# Patient Record
Sex: MALE | Race: WHITE | NOT HISPANIC OR LATINO | URBAN - METROPOLITAN AREA
[De-identification: names, ages, dates, MRNs, and addresses within clinical notes are randomized per-mention and may not be internally consistent; named-entity substitution may affect disease eponyms.]

---

## 2018-06-25 ENCOUNTER — EMERGENCY (EMERGENCY)
Facility: HOSPITAL | Age: 63
LOS: 1 days | Discharge: HOME | End: 2018-06-25
Attending: EMERGENCY MEDICINE
Payer: COMMERCIAL

## 2018-06-25 VITALS
RESPIRATION RATE: 18 BRPM | TEMPERATURE: 98 F | HEIGHT: 73 IN | SYSTOLIC BLOOD PRESSURE: 153 MMHG | OXYGEN SATURATION: 100 % | DIASTOLIC BLOOD PRESSURE: 88 MMHG | HEART RATE: 77 BPM | WEIGHT: 190.04 LBS

## 2018-06-25 PROCEDURE — 11760 REPAIR OF NAIL BED: CPT | Mod: F1

## 2018-06-25 PROCEDURE — 11760 REPAIR OF NAIL BED: CPT | Mod: 54

## 2018-06-25 PROCEDURE — 99283 EMERGENCY DEPT VISIT LOW MDM: CPT | Mod: 25

## 2018-06-25 RX ORDER — OXYCODONE HYDROCHLORIDE 5 MG/1
1 TABLET ORAL
Qty: 10 | Refills: 0
Start: 2018-06-25

## 2018-06-25 NOTE — ED ADULT TRIAGE NOTE - CHIEF COMPLAINT QUOTE
using razor knife at work, cut left index finger. patient is controlling bleeding with pressure and elevation.   baby aspirin daily. last tetanus within the last ten years.

## 2018-06-25 NOTE — ED PROVIDER NOTE - MEDICAL DECISION MAKING DETAILS
Laceration repair; Patient UTD on tetanus; No suspicion for foreign body based on mechanism of injury and exam. Laceration repair; Patient UTD on tetanus; No suspicion for foreign body based on mechanism of injury and exam.      Attending note-laceration to left index finger. Suture. Tetanus is up-to-date.

## 2018-06-25 NOTE — ED PROVIDER NOTE - OBJECTIVE STATEMENT
64 yo male with no pertinent PMH presents to the ED s/p cut to left index finger sustained one hour prior to ED arrival. Pt was cutting vinyl with a razor at his office when the blade skipped and he mistakenly cut his finger. UTD on tetanus, takes one 81mg ASA a day, otherwise no AC. 62 yo male with no pertinent PMH presents to the ED s/p cut to left index finger sustained one hour prior to ED arrival. Pt was cutting vinyl with a razor at his office when the blade skipped and he mistakenly cut his finger. UTD on tetanus, takes one 81mg ASA a day, otherwise no AC.        Attending note. Patient was seen in the fast track room #2. Patient is right-hand-dominant and lacerated his left index finger with a knife today. Patient denies any numbness or paresthesia. Patient states his tetanus is within 10 years.

## 2018-06-25 NOTE — ED PROVIDER NOTE - PHYSICAL EXAMINATION
Skin: laceration to left indes finger involving distal aspect of finger. Laceration located along latera aspect of finger and abutting but not involving the nail. Full ROM, sensation grossly intact, no subungual hematoma, capillary refill less than 2 seconds Skin: laceration to left indes finger involving distal aspect of finger. Laceration located along latera aspect of finger and abutting but not involving the nail. Full ROM, sensation grossly intact, no subungual hematoma, capillary refill less than 2 seconds        Attending note. Patient is alert and in no acute distress. Examination of the left hand reveals a 3 cm flap V-shaped laceration on the radial aspect of the distal finger. Patient has good capillary refill. Sensation is intact.

## 2018-07-06 ENCOUNTER — EMERGENCY (EMERGENCY)
Facility: HOSPITAL | Age: 63
LOS: 1 days | Discharge: HOME | End: 2018-07-06
Attending: EMERGENCY MEDICINE
Payer: COMMERCIAL

## 2018-07-06 VITALS
RESPIRATION RATE: 18 BRPM | HEIGHT: 73 IN | DIASTOLIC BLOOD PRESSURE: 77 MMHG | WEIGHT: 195.11 LBS | OXYGEN SATURATION: 98 % | HEART RATE: 72 BPM | TEMPERATURE: 98 F | SYSTOLIC BLOOD PRESSURE: 128 MMHG

## 2018-07-06 PROCEDURE — G0463: CPT

## 2018-07-06 NOTE — ED ADULT NURSE NOTE - CHPI ED SYMPTOMS NEG
no drainage/no fever/no pain/no red streaks/no inflammation/no purulent drainage/no bleeding/no chills/no redness

## 2018-07-06 NOTE — ED PROVIDER NOTE - CARE PLAN
Principal Discharge DX:	Laceration of finger  Goal:	L hand, 2nd digit, subsequent encounter  Assessment and plan of treatment:	1) Please return to the ED should you have any new or worsening symptoms, worsening pain, develop fever, discharge or pus from the wound  2) Please follow up with your primary care doctor in 2-3 days.

## 2018-07-06 NOTE — ED PROVIDER NOTE - PLAN OF CARE
L hand, 2nd digit, subsequent encounter 1) Please return to the ED should you have any new or worsening symptoms, worsening pain, develop fever, discharge or pus from the wound  2) Please follow up with your primary care doctor in 2-3 days.

## 2018-07-06 NOTE — ED ADULT NURSE NOTE - OBJECTIVE STATEMENT
Pt presents for eval and removal of sutures tip of left 2nd finger after placement 11 days ago.  Wound margins well approximated, without erythema or drainage.

## 2018-07-06 NOTE — ED PROVIDER NOTE - MUSCULOSKELETAL, MLM
Strength appropriate for age. Full active range of motion of all 4 extremities. Full ROM of L hand all digits.

## 2018-07-06 NOTE — ED PROVIDER NOTE - ATTENDING CONTRIBUTION TO CARE
Attending MD Salter: I personally have seen and examined this patient.  Resident note reviewed and agree on plan of care and except where noted.  See below for details.     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  Reports injury sustatined while using a new knife, L index finger lac.  Denies bleeding, purulence, fevers, chills.  R handed.  On exam, NAD, L index finger with edges well approximated, no fluctuance, no erythema, no purulence, no bleeding; A/P: 63M with laceration, now well approximated, see procedure note for removal of sutures

## 2018-07-06 NOTE — ED PROVIDER NOTE - OBJECTIVE STATEMENT
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 No f/c, redness, discharge. Full ROM of L hand. Patient is RH dominant.

## 2018-07-06 NOTE — ED PROVIDER NOTE - MEDICAL DECISION MAKING DETAILS
Jason Ward (Resident): 64 y/o, laceration of L hand 2nd digit - suture removal - looks well, no fevers, discharge, or pus from wound - full ROM - will remove sutures and d/c home - d/w patient any fevers, swelling or concerning symptoms- provided him some bacitracin for wound

## 2021-04-14 ENCOUNTER — APPOINTMENT (OUTPATIENT)
Dept: UROLOGY | Facility: CLINIC | Age: 66
End: 2021-04-14
Payer: COMMERCIAL

## 2021-04-14 VITALS
WEIGHT: 212 LBS | BODY MASS INDEX: 28.1 KG/M2 | HEART RATE: 82 BPM | TEMPERATURE: 97.2 F | RESPIRATION RATE: 18 BRPM | SYSTOLIC BLOOD PRESSURE: 134 MMHG | OXYGEN SATURATION: 97 % | DIASTOLIC BLOOD PRESSURE: 92 MMHG | HEIGHT: 73 IN

## 2021-04-14 DIAGNOSIS — Z86.79 PERSONAL HISTORY OF OTHER DISEASES OF THE CIRCULATORY SYSTEM: ICD-10-CM

## 2021-04-14 DIAGNOSIS — Z86.39 PERSONAL HISTORY OF OTHER ENDOCRINE, NUTRITIONAL AND METABOLIC DISEASE: ICD-10-CM

## 2021-04-14 DIAGNOSIS — Z72.89 OTHER PROBLEMS RELATED TO LIFESTYLE: ICD-10-CM

## 2021-04-14 DIAGNOSIS — R97.20 ELEVATED PROSTATE, SPECIFIC ANTIGEN [PSA]: ICD-10-CM

## 2021-04-14 PROCEDURE — 99203 OFFICE O/P NEW LOW 30 MIN: CPT

## 2021-04-14 PROCEDURE — 99072 ADDL SUPL MATRL&STAF TM PHE: CPT

## 2021-04-14 NOTE — ASSESSMENT
[FreeTextEntry1] : Reviewed PSA and labs with patient. \par Explained MRI results with patient. \par Highly recommended  a fusion transperineal prostate biopsy. Discussed the procedure with patient.\par MRI done at [facility]. Disk to be uploaded and marked for fusion biopsy with [facility].\par Rx for diazepam ordered and advised patient to obtain a Fleet enema. \par Answered all questions and addressed all concerns. \par Will schedule patient for next available biopsy date with follow-up appointment. \par 30 min discussion with review of images by me before and writing his note

## 2021-04-14 NOTE — PHYSICAL EXAM
[Normal Appearance] : normal appearance [Well Groomed] : well groomed [General Appearance - In No Acute Distress] : no acute distress [Edema] : no peripheral edema [] : no respiratory distress [Urethral Meatus] : meatus normal [Normal Station and Gait] : the gait and station were normal for the patient's age [Skin Color & Pigmentation] : normal skin color and pigmentation [No Focal Deficits] : no focal deficits [Oriented To Time, Place, And Person] : oriented to person, place, and time [Affect] : the affect was normal [No Palpable Adenopathy] : no palpable adenopathy [Abdomen Soft] : soft [Abdomen Tenderness] : non-tender [Costovertebral Angle Tenderness] : no ~M costovertebral angle tenderness [FreeTextEntry1] : STEFANIE firm, hard prostate palpated.

## 2021-04-14 NOTE — HISTORY OF PRESENT ILLNESS
[None] : no symptoms [FreeTextEntry1] : 65 yo presents today for an elevated PSA. PCP Dr. Galeas had ordered routine labs with PSA. PSA 4.3 (8.7.2020). MRI prostate ordered and performed 3.30.2021.MRI prostate showed a PIRADS 5 right posterior peripheral zone involving the left posteromedial peripheral zone suspicious for synovial prostate cancer. \par No family history of prostate cancer. \par Referred for fusion transperineal prostate biopsy.

## 2021-04-14 NOTE — ASSESSMENT
[FreeTextEntry1] : Reviewed PSA and labs with patient. \par Explained MRI results with patient. \par Highly recommended  a fusion transperineal prostate biopsy. Discussed the procedure with patient.\par MRI done at [facility]. Disk to be uploaded and marked for fusion biopsy with [facility].\par Rx for diazepam ordered and advised patient to obtain a Fleet enema. \par Answered all questions and addressed all concerns. \par Will schedule patient for next available biopsy date with follow-up appointment. \par 30 min discussion with review of images by me before and writing his note

## 2021-04-19 ENCOUNTER — NON-APPOINTMENT (OUTPATIENT)
Age: 66
End: 2021-04-19

## 2021-05-11 ENCOUNTER — NON-APPOINTMENT (OUTPATIENT)
Age: 66
End: 2021-05-11

## 2021-05-12 ENCOUNTER — APPOINTMENT (OUTPATIENT)
Dept: UROLOGY | Facility: CLINIC | Age: 66
End: 2021-05-12
Payer: COMMERCIAL

## 2021-05-12 VITALS
HEART RATE: 75 BPM | HEIGHT: 73 IN | SYSTOLIC BLOOD PRESSURE: 121 MMHG | DIASTOLIC BLOOD PRESSURE: 82 MMHG | BODY MASS INDEX: 28.23 KG/M2 | WEIGHT: 213 LBS | TEMPERATURE: 97.2 F | OXYGEN SATURATION: 98 %

## 2021-05-12 VITALS — DIASTOLIC BLOOD PRESSURE: 76 MMHG | SYSTOLIC BLOOD PRESSURE: 120 MMHG

## 2021-05-12 PROCEDURE — 76872 US TRANSRECTAL: CPT

## 2021-05-12 PROCEDURE — 99072 ADDL SUPL MATRL&STAF TM PHE: CPT

## 2021-05-12 PROCEDURE — 55700: CPT | Mod: 22

## 2021-05-12 PROCEDURE — 76377 3D RENDER W/INTRP POSTPROCES: CPT

## 2021-05-12 PROCEDURE — 76942 ECHO GUIDE FOR BIOPSY: CPT | Mod: 59

## 2021-05-17 LAB — CORE LAB BIOPSY: NORMAL

## 2021-05-19 ENCOUNTER — APPOINTMENT (OUTPATIENT)
Dept: UROLOGY | Facility: CLINIC | Age: 66
End: 2021-05-19
Payer: COMMERCIAL

## 2021-05-19 VITALS
HEIGHT: 73 IN | SYSTOLIC BLOOD PRESSURE: 132 MMHG | TEMPERATURE: 97.4 F | BODY MASS INDEX: 28.23 KG/M2 | WEIGHT: 213 LBS | DIASTOLIC BLOOD PRESSURE: 88 MMHG | HEART RATE: 69 BPM | OXYGEN SATURATION: 97 %

## 2021-05-19 PROCEDURE — 99072 ADDL SUPL MATRL&STAF TM PHE: CPT

## 2021-05-19 PROCEDURE — 99214 OFFICE O/P EST MOD 30 MIN: CPT

## 2021-05-19 NOTE — PHYSICAL EXAM
[General Appearance - Well Developed] : well developed [General Appearance - Well Nourished] : well nourished [Normal Appearance] : normal appearance [Well Groomed] : well groomed [General Appearance - In No Acute Distress] : no acute distress [Edema] : no peripheral edema [] : no respiratory distress [Respiration, Rhythm And Depth] : normal respiratory rhythm and effort [Exaggerated Use Of Accessory Muscles For Inspiration] : no accessory muscle use [Oriented To Time, Place, And Person] : oriented to person, place, and time [Affect] : the affect was normal [Mood] : the mood was normal [Not Anxious] : not anxious [No Focal Deficits] : no focal deficits [Normal Station and Gait] : the gait and station were normal for the patient's age [Abdomen Soft] : soft [Abdomen Tenderness] : non-tender [Costovertebral Angle Tenderness] : no ~M costovertebral angle tenderness [Urethral Meatus] : meatus normal [Urinary Bladder Findings] : the bladder was normal on palpation [Scrotum] : the scrotum was normal [Testes Mass (___cm)] : there were no testicular masses

## 2021-05-19 NOTE — ASSESSMENT
[FreeTextEntry1] : 66yoM with Dominic 4+3 prostate cancer, some intraductal component and perineural invasion\par We discussed all treatment options for treatment of localized prostate cancer.   \par These included active surveillance, radiation therapy, IMRT and Cyberknife and radical prostatectomy\par We discussed the risks, benefits and complications of radiation therapy.   We discussed the option of robotic radical prostatectomy and the advantages and disadvantages.   We discussed the risks, benefits and alternatives and complications specifically impotence and incontinence.  We discussed the procedure, in hospital stay and the recovery and expectations.  We discussed my experience with this procedure and my outcomes.\par \par We discussed the cancer outcomes of each options as well.\par \par -Discussed options for treatment, including surgery and radiation +HT\par -Will refer to radiation oncology for consultation\par -Will obtain CT A&P\par -Will obtain bone scan

## 2021-05-19 NOTE — HISTORY OF PRESENT ILLNESS
[None] : no symptoms [FreeTextEntry1] : 66yoM with Dominic 4+3, 3+4, 3+3 prostate cancer.\par PSA 4.3, PIRADS 5 lesion with EPE on MRI.\par Some intraductal carcinoma present.

## 2021-05-29 ENCOUNTER — APPOINTMENT (OUTPATIENT)
Dept: CT IMAGING | Facility: CLINIC | Age: 66
End: 2021-05-29
Payer: COMMERCIAL

## 2021-05-29 ENCOUNTER — OUTPATIENT (OUTPATIENT)
Dept: OUTPATIENT SERVICES | Facility: HOSPITAL | Age: 66
LOS: 1 days | End: 2021-05-29
Payer: COMMERCIAL

## 2021-05-29 DIAGNOSIS — C61 MALIGNANT NEOPLASM OF PROSTATE: ICD-10-CM

## 2021-05-29 PROCEDURE — 74177 CT ABD & PELVIS W/CONTRAST: CPT | Mod: 26

## 2021-05-29 PROCEDURE — 74177 CT ABD & PELVIS W/CONTRAST: CPT

## 2021-05-29 PROCEDURE — 82565 ASSAY OF CREATININE: CPT

## 2021-06-02 ENCOUNTER — NON-APPOINTMENT (OUTPATIENT)
Age: 66
End: 2021-06-02

## 2021-06-07 ENCOUNTER — APPOINTMENT (OUTPATIENT)
Dept: UROLOGY | Facility: CLINIC | Age: 66
End: 2021-06-07
Payer: COMMERCIAL

## 2021-06-07 VITALS — HEIGHT: 73 IN | RESPIRATION RATE: 15 BRPM | TEMPERATURE: 97.7 F

## 2021-06-07 PROCEDURE — 99072 ADDL SUPL MATRL&STAF TM PHE: CPT

## 2021-06-07 PROCEDURE — 99214 OFFICE O/P EST MOD 30 MIN: CPT

## 2021-06-07 NOTE — PHYSICAL EXAM
[General Appearance - Well Developed] : well developed [General Appearance - Well Nourished] : well nourished [Normal Appearance] : normal appearance [Well Groomed] : well groomed [General Appearance - In No Acute Distress] : no acute distress [] : no respiratory distress [Respiration, Rhythm And Depth] : normal respiratory rhythm and effort [Exaggerated Use Of Accessory Muscles For Inspiration] : no accessory muscle use [Oriented To Time, Place, And Person] : oriented to person, place, and time [Affect] : the affect was normal [Mood] : the mood was normal [Not Anxious] : not anxious [Normal Station and Gait] : the gait and station were normal for the patient's age [No Focal Deficits] : no focal deficits [Abdomen Soft] : soft [Abdomen Tenderness] : non-tender [Costovertebral Angle Tenderness] : no ~M costovertebral angle tenderness [Urethral Meatus] : meatus normal [Urinary Bladder Findings] : the bladder was normal on palpation [Scrotum] : the scrotum was normal [Testes Mass (___cm)] : there were no testicular masses

## 2021-06-07 NOTE — ASSESSMENT
[FreeTextEntry1] : 66yoM with [place] 4+3 prostate cancer, some intraductal component and perineural invasion\par We discussed all treatment options for treatment of localized prostate cancer.   \par These included active surveillance, radiation therapy, IMRT and Cyberknife and radical prostatectomy\par We discussed the risks, benefits and complications of radiation therapy.   We discussed the option of robotic radical prostatectomy and the advantages and disadvantages.   We discussed the risks, benefits and alternatives and complications specifically impotence and incontinence.  We discussed the procedure, in hospital stay and the recovery and expectations.  We discussed my experience with this procedure and my outcomes.\par \par We discussed the cancer outcomes of each options as well.\par \par -Discussed options for treatment, including surgery and radiation +HT\par -Patient desires surgery\par -Surgical consent signed today\par -Will book for surgery\par -Bone scan scheduled for Wednesday

## 2021-06-07 NOTE — HISTORY OF PRESENT ILLNESS
[None] : no symptoms [FreeTextEntry1] : 66yoM with Dominic 4+3, 3+4, 3+3 prostate cancer.\par PSA 4.3, PIRADS 5 lesion with EPE on MRI.\par Some intraductal carcinoma present.\par CT no evidence of metastatic disease, bone scan pending.\par \par No prior abdominal surgery.

## 2021-06-09 ENCOUNTER — OUTPATIENT (OUTPATIENT)
Dept: OUTPATIENT SERVICES | Facility: HOSPITAL | Age: 66
LOS: 1 days | End: 2021-06-09
Payer: COMMERCIAL

## 2021-06-09 ENCOUNTER — APPOINTMENT (OUTPATIENT)
Dept: NUCLEAR MEDICINE | Facility: CLINIC | Age: 66
End: 2021-06-09
Payer: COMMERCIAL

## 2021-06-09 ENCOUNTER — RESULT REVIEW (OUTPATIENT)
Age: 66
End: 2021-06-09

## 2021-06-09 ENCOUNTER — OUTPATIENT (OUTPATIENT)
Dept: OUTPATIENT SERVICES | Facility: HOSPITAL | Age: 66
LOS: 1 days | End: 2021-06-09

## 2021-06-09 ENCOUNTER — NON-APPOINTMENT (OUTPATIENT)
Age: 66
End: 2021-06-09

## 2021-06-09 DIAGNOSIS — C61 MALIGNANT NEOPLASM OF PROSTATE: ICD-10-CM

## 2021-06-09 PROCEDURE — 78306 BONE IMAGING WHOLE BODY: CPT | Mod: 26

## 2021-06-09 PROCEDURE — 88321 CONSLTJ&REPRT SLD PREP ELSWR: CPT

## 2021-06-10 DIAGNOSIS — C61 MALIGNANT NEOPLASM OF PROSTATE: ICD-10-CM

## 2021-06-16 ENCOUNTER — RESULT REVIEW (OUTPATIENT)
Age: 66
End: 2021-06-16

## 2021-06-16 ENCOUNTER — OUTPATIENT (OUTPATIENT)
Dept: OUTPATIENT SERVICES | Facility: HOSPITAL | Age: 66
LOS: 1 days | End: 2021-06-16
Payer: COMMERCIAL

## 2021-06-16 VITALS
WEIGHT: 207.9 LBS | SYSTOLIC BLOOD PRESSURE: 133 MMHG | TEMPERATURE: 98 F | RESPIRATION RATE: 16 BRPM | DIASTOLIC BLOOD PRESSURE: 85 MMHG | OXYGEN SATURATION: 100 % | HEIGHT: 73 IN | HEART RATE: 65 BPM

## 2021-06-16 DIAGNOSIS — M51.26 OTHER INTERVERTEBRAL DISC DISPLACEMENT, LUMBAR REGION: Chronic | ICD-10-CM

## 2021-06-16 DIAGNOSIS — Z98.890 OTHER SPECIFIED POSTPROCEDURAL STATES: Chronic | ICD-10-CM

## 2021-06-16 DIAGNOSIS — C61 MALIGNANT NEOPLASM OF PROSTATE: ICD-10-CM

## 2021-06-16 DIAGNOSIS — Z29.9 ENCOUNTER FOR PROPHYLACTIC MEASURES, UNSPECIFIED: ICD-10-CM

## 2021-06-16 DIAGNOSIS — Z01.818 ENCOUNTER FOR OTHER PREPROCEDURAL EXAMINATION: ICD-10-CM

## 2021-06-16 LAB
ANION GAP SERPL CALC-SCNC: 4 MMOL/L — LOW (ref 5–17)
APPEARANCE UR: CLEAR — SIGNIFICANT CHANGE UP
APTT BLD: 27.2 SEC — LOW (ref 27.5–35.5)
BASOPHILS # BLD AUTO: 0.03 K/UL — SIGNIFICANT CHANGE UP (ref 0–0.2)
BASOPHILS NFR BLD AUTO: 0.4 % — SIGNIFICANT CHANGE UP (ref 0–2)
BILIRUB UR-MCNC: NEGATIVE — SIGNIFICANT CHANGE UP
BUN SERPL-MCNC: 15 MG/DL — SIGNIFICANT CHANGE UP (ref 7–23)
CALCIUM SERPL-MCNC: 8.7 MG/DL — SIGNIFICANT CHANGE UP (ref 8.5–10.1)
CHLORIDE SERPL-SCNC: 107 MMOL/L — SIGNIFICANT CHANGE UP (ref 96–108)
CO2 SERPL-SCNC: 28 MMOL/L — SIGNIFICANT CHANGE UP (ref 22–31)
COLOR SPEC: YELLOW — SIGNIFICANT CHANGE UP
CREAT SERPL-MCNC: 1.03 MG/DL — SIGNIFICANT CHANGE UP (ref 0.5–1.3)
DIFF PNL FLD: ABNORMAL
EOSINOPHIL # BLD AUTO: 0.19 K/UL — SIGNIFICANT CHANGE UP (ref 0–0.5)
EOSINOPHIL NFR BLD AUTO: 2.5 % — SIGNIFICANT CHANGE UP (ref 0–6)
GLUCOSE SERPL-MCNC: 103 MG/DL — HIGH (ref 70–99)
GLUCOSE UR QL: NEGATIVE MG/DL — SIGNIFICANT CHANGE UP
HCT VFR BLD CALC: 46.8 % — SIGNIFICANT CHANGE UP (ref 39–50)
HGB BLD-MCNC: 16.1 G/DL — SIGNIFICANT CHANGE UP (ref 13–17)
IMM GRANULOCYTES NFR BLD AUTO: 0.3 % — SIGNIFICANT CHANGE UP (ref 0–1.5)
INR BLD: 1.12 RATIO — SIGNIFICANT CHANGE UP (ref 0.88–1.16)
KETONES UR-MCNC: NEGATIVE — SIGNIFICANT CHANGE UP
LEUKOCYTE ESTERASE UR-ACNC: NEGATIVE — SIGNIFICANT CHANGE UP
LYMPHOCYTES # BLD AUTO: 1.5 K/UL — SIGNIFICANT CHANGE UP (ref 1–3.3)
LYMPHOCYTES # BLD AUTO: 19.7 % — SIGNIFICANT CHANGE UP (ref 13–44)
MCHC RBC-ENTMCNC: 31.4 PG — SIGNIFICANT CHANGE UP (ref 27–34)
MCHC RBC-ENTMCNC: 34.4 GM/DL — SIGNIFICANT CHANGE UP (ref 32–36)
MCV RBC AUTO: 91.2 FL — SIGNIFICANT CHANGE UP (ref 80–100)
MONOCYTES # BLD AUTO: 0.59 K/UL — SIGNIFICANT CHANGE UP (ref 0–0.9)
MONOCYTES NFR BLD AUTO: 7.8 % — SIGNIFICANT CHANGE UP (ref 2–14)
NEUTROPHILS # BLD AUTO: 5.28 K/UL — SIGNIFICANT CHANGE UP (ref 1.8–7.4)
NEUTROPHILS NFR BLD AUTO: 69.3 % — SIGNIFICANT CHANGE UP (ref 43–77)
NITRITE UR-MCNC: NEGATIVE — SIGNIFICANT CHANGE UP
PH UR: 5 — SIGNIFICANT CHANGE UP (ref 5–8)
PLATELET # BLD AUTO: 238 K/UL — SIGNIFICANT CHANGE UP (ref 150–400)
POTASSIUM SERPL-MCNC: 4 MMOL/L — SIGNIFICANT CHANGE UP (ref 3.5–5.3)
POTASSIUM SERPL-SCNC: 4 MMOL/L — SIGNIFICANT CHANGE UP (ref 3.5–5.3)
PROT UR-MCNC: 15 MG/DL
PROTHROM AB SERPL-ACNC: 12.9 SEC — SIGNIFICANT CHANGE UP (ref 10.6–13.6)
RBC # BLD: 5.13 M/UL — SIGNIFICANT CHANGE UP (ref 4.2–5.8)
RBC # FLD: 12.8 % — SIGNIFICANT CHANGE UP (ref 10.3–14.5)
SODIUM SERPL-SCNC: 139 MMOL/L — SIGNIFICANT CHANGE UP (ref 135–145)
SP GR SPEC: 1.01 — SIGNIFICANT CHANGE UP (ref 1.01–1.02)
UROBILINOGEN FLD QL: NEGATIVE MG/DL — SIGNIFICANT CHANGE UP
WBC # BLD: 7.61 K/UL — SIGNIFICANT CHANGE UP (ref 3.8–10.5)
WBC # FLD AUTO: 7.61 K/UL — SIGNIFICANT CHANGE UP (ref 3.8–10.5)

## 2021-06-16 PROCEDURE — 86850 RBC ANTIBODY SCREEN: CPT

## 2021-06-16 PROCEDURE — 36415 COLL VENOUS BLD VENIPUNCTURE: CPT

## 2021-06-16 PROCEDURE — 85610 PROTHROMBIN TIME: CPT

## 2021-06-16 PROCEDURE — 85730 THROMBOPLASTIN TIME PARTIAL: CPT

## 2021-06-16 PROCEDURE — 87086 URINE CULTURE/COLONY COUNT: CPT

## 2021-06-16 PROCEDURE — G0463: CPT | Mod: 25

## 2021-06-16 PROCEDURE — 71046 X-RAY EXAM CHEST 2 VIEWS: CPT

## 2021-06-16 PROCEDURE — 80048 BASIC METABOLIC PNL TOTAL CA: CPT

## 2021-06-16 PROCEDURE — 85025 COMPLETE CBC W/AUTO DIFF WBC: CPT

## 2021-06-16 PROCEDURE — 71046 X-RAY EXAM CHEST 2 VIEWS: CPT | Mod: 26

## 2021-06-16 PROCEDURE — 86901 BLOOD TYPING SEROLOGIC RH(D): CPT

## 2021-06-16 PROCEDURE — 86900 BLOOD TYPING SEROLOGIC ABO: CPT

## 2021-06-16 PROCEDURE — 81001 URINALYSIS AUTO W/SCOPE: CPT

## 2021-06-16 NOTE — H&P PST ADULT - NSICDXPASTMEDICALHX_GEN_ALL_CORE_FT
PAST MEDICAL HISTORY:  COVID-19 vaccine series completed moderna . 2 doses.last dose 4/11/2021    Graves disease     Hyperlipidemia     Hypertension     Hyperthyroidism     Lumbar herniated disc     Prostate cancer

## 2021-06-16 NOTE — H&P PST ADULT - HISTORY OF PRESENT ILLNESS
66 years old male with elevated PSA. He had a MRI with contrast. He then had a biopsy . Diagnosed with Prostate cancer. He then had a bone and CT. Scan of pelvis. Denies difficulty urinating or hematuria. Planned Prostatectomy.

## 2021-06-16 NOTE — H&P PST ADULT - ASSESSMENT
66 years old male present to PST prior to robotic radical prostatectomy with Dr. Rob.    Plan   1. NPO after midnight  2. Stop Aspirin 7 days prior to surgery  3. Use E-Z sponge as directed  4. Covid swab scheduled for 2021  5. Drink a quart of extra  fluids the day before your surgery.  6 Medical optimization for surgery with Dr. Galeas  7. CBC, BMP, PT/ INR and PTT, Urinalysis, Urine Culture, Type and Screen sent to lab  8. EKG done by Dr. Galeas  9. Chest x- ray done in [facility]    CAPRINI SCORE [CLOT]    AGE RELATED RISK FACTORS                                                       MOBILITY RELATED FACTORS  [ ] Age 41-60 years                                            (1 Point)                  [ ] Bed rest                                                        (1 Point)  [x ] Age: 61-74 years                                           (2 Points)                 [ ] Plaster cast                                                   (2 Points)  [ ] Age= 75 years                                              (3 Points)                 [ ] Bed bound for more than 72 hours                 (2 Points)    DISEASE RELATED RISK FACTORS                                               GENDER SPECIFIC FACTORS  [ ] Edema in the lower extremities                       (1 Point)                  [ ] Pregnancy                                                     (1 Point)  [ ] Varicose veins                                               (1 Point)                  [ ] Post-partum < 6 weeks                                   (1 Point)             [ x] BMI > 25 Kg/m2                                            (1 Point)                  [ ] Hormonal therapy  or oral contraception          (1 Point)                 [ ] Sepsis (in the previous month)                        (1 Point)                  [ ] History of pregnancy complications                 (1 point)  [ ] Pneumonia or serious lung disease                                               [ ] Unexplained or recurrent                     (1 Point)           (in the previous month)                               (1 Point)  [ ] Abnormal pulmonary function test                     (1 Point)                 SURGERY RELATED RISK FACTORS  [ ] Acute myocardial infarction                              (1 Point)                 [ ]  Section                                             (1 Point)  [ ] Congestive heart failure (in the previous month)  (1 Point)               [ ] Minor surgery                                                  (1 Point)   [ ] Inflammatory bowel disease                             (1 Point)                 [ ] Arthroscopic surgery                                        (2 Points)  [ ] Central venous access                                      (2 Points)                [x ] General surgery lasting more than 45 minutes   (2 Points)       [ ] Stroke (in the previous month)                          (5 Points)               [ ] Elective arthroplasty                                         (5 Points)            [ ] malignancy present or previous                      (2 points)                                                                                                                                   HEMATOLOGY RELATED FACTORS                                                 TRAUMA RELATED RISK FACTORS  [ ] Prior episodes of VTE                                     (3 Points)                 [ ] Fracture of the hip, pelvis, or leg                       (5 Points)  [ ] Positive family history for VTE                         (3 Points)                 [ ] Acute spinal cord injury (in the previous month)  (5 Points)  [ ] Prothrombin 13336 A                                     (3 Points)                 [ ] Paralysis  (less than 1 month)                             (5 Points)  [ ] Factor V Leiden                                             (3 Points)                  [ ] Multiple Trauma within 1 month                        (5 Points)  [ ] Lupus anticoagulants                                     (3 Points)                                                           [ ] Anticardiolipin antibodies                               (3 Points)                                                       [ ] High homocysteine in the blood                      (3 Points)                                             [ ] Other congenital or acquired thrombophilia      (3 Points)                                                [ ] Heparin induced thrombocytopenia                  (3 Points)                                          Total Score [     5     ]

## 2021-06-16 NOTE — H&P PST ADULT - BLOOD AVOIDANCE/RESTRICTIONS, PROFILE
Patient reminded to  new prescription for trelegy inhaler  Follow up with pulmonology as ordered  none

## 2021-06-16 NOTE — H&P PST ADULT - NSICDXFAMILYHX_GEN_ALL_CORE_FT
FAMILY HISTORY:  Father  Still living? No  Family history of heart disease, Age at diagnosis: Age Unknown  Family history of Parkinson's disease, Age at diagnosis: Age Unknown    Mother  Still living? Yes, Estimated age:   Family history of heart disease, Age at diagnosis: Age Unknown

## 2021-06-17 DIAGNOSIS — Z01.818 ENCOUNTER FOR OTHER PREPROCEDURAL EXAMINATION: ICD-10-CM

## 2021-06-17 DIAGNOSIS — C61 MALIGNANT NEOPLASM OF PROSTATE: ICD-10-CM

## 2021-06-17 LAB
CULTURE RESULTS: SIGNIFICANT CHANGE UP
SPECIMEN SOURCE: SIGNIFICANT CHANGE UP

## 2021-06-22 ENCOUNTER — APPOINTMENT (OUTPATIENT)
Dept: DISASTER EMERGENCY | Facility: CLINIC | Age: 66
End: 2021-06-22

## 2021-06-22 DIAGNOSIS — Z01.818 ENCOUNTER FOR OTHER PREPROCEDURAL EXAMINATION: ICD-10-CM

## 2021-06-22 LAB — SARS-COV-2 N GENE NPH QL NAA+PROBE: NOT DETECTED

## 2021-06-23 ENCOUNTER — TRANSCRIPTION ENCOUNTER (OUTPATIENT)
Age: 66
End: 2021-06-23

## 2021-06-25 ENCOUNTER — APPOINTMENT (OUTPATIENT)
Dept: UROLOGY | Facility: HOSPITAL | Age: 66
End: 2021-06-25

## 2021-06-25 ENCOUNTER — RESULT REVIEW (OUTPATIENT)
Age: 66
End: 2021-06-25

## 2021-06-25 ENCOUNTER — INPATIENT (INPATIENT)
Facility: HOSPITAL | Age: 66
LOS: 0 days | Discharge: HOME | DRG: 708 | End: 2021-06-26
Attending: UROLOGY | Admitting: UROLOGY
Payer: COMMERCIAL

## 2021-06-25 VITALS
SYSTOLIC BLOOD PRESSURE: 128 MMHG | HEIGHT: 73 IN | OXYGEN SATURATION: 100 % | WEIGHT: 201.94 LBS | RESPIRATION RATE: 14 BRPM | HEART RATE: 66 BPM | TEMPERATURE: 98 F | DIASTOLIC BLOOD PRESSURE: 75 MMHG

## 2021-06-25 DIAGNOSIS — C61 MALIGNANT NEOPLASM OF PROSTATE: ICD-10-CM

## 2021-06-25 DIAGNOSIS — I10 ESSENTIAL (PRIMARY) HYPERTENSION: ICD-10-CM

## 2021-06-25 DIAGNOSIS — E78.5 HYPERLIPIDEMIA, UNSPECIFIED: ICD-10-CM

## 2021-06-25 DIAGNOSIS — Z79.890 HORMONE REPLACEMENT THERAPY: ICD-10-CM

## 2021-06-25 DIAGNOSIS — E03.9 HYPOTHYROIDISM, UNSPECIFIED: ICD-10-CM

## 2021-06-25 DIAGNOSIS — Z79.82 LONG TERM (CURRENT) USE OF ASPIRIN: ICD-10-CM

## 2021-06-25 DIAGNOSIS — Z98.890 OTHER SPECIFIED POSTPROCEDURAL STATES: Chronic | ICD-10-CM

## 2021-06-25 LAB
ANION GAP SERPL CALC-SCNC: 5 MMOL/L — SIGNIFICANT CHANGE UP (ref 5–17)
BASOPHILS # BLD AUTO: 0.04 K/UL — SIGNIFICANT CHANGE UP (ref 0–0.2)
BASOPHILS NFR BLD AUTO: 0.3 % — SIGNIFICANT CHANGE UP (ref 0–2)
BUN SERPL-MCNC: 13 MG/DL — SIGNIFICANT CHANGE UP (ref 7–23)
CALCIUM SERPL-MCNC: 8.6 MG/DL — SIGNIFICANT CHANGE UP (ref 8.5–10.1)
CHLORIDE SERPL-SCNC: 107 MMOL/L — SIGNIFICANT CHANGE UP (ref 96–108)
CO2 SERPL-SCNC: 25 MMOL/L — SIGNIFICANT CHANGE UP (ref 22–31)
CREAT SERPL-MCNC: 1.21 MG/DL — SIGNIFICANT CHANGE UP (ref 0.5–1.3)
EOSINOPHIL # BLD AUTO: 0.01 K/UL — SIGNIFICANT CHANGE UP (ref 0–0.5)
EOSINOPHIL NFR BLD AUTO: 0.1 % — SIGNIFICANT CHANGE UP (ref 0–6)
GLUCOSE SERPL-MCNC: 141 MG/DL — HIGH (ref 70–99)
HCT VFR BLD CALC: 47 % — SIGNIFICANT CHANGE UP (ref 39–50)
HGB BLD-MCNC: 15.5 G/DL — SIGNIFICANT CHANGE UP (ref 13–17)
IMM GRANULOCYTES NFR BLD AUTO: 0.2 % — SIGNIFICANT CHANGE UP (ref 0–1.5)
LYMPHOCYTES # BLD AUTO: 0.77 K/UL — LOW (ref 1–3.3)
LYMPHOCYTES # BLD AUTO: 5.6 % — LOW (ref 13–44)
MCHC RBC-ENTMCNC: 30.8 PG — SIGNIFICANT CHANGE UP (ref 27–34)
MCHC RBC-ENTMCNC: 33 GM/DL — SIGNIFICANT CHANGE UP (ref 32–36)
MCV RBC AUTO: 93.4 FL — SIGNIFICANT CHANGE UP (ref 80–100)
MONOCYTES # BLD AUTO: 0.16 K/UL — SIGNIFICANT CHANGE UP (ref 0–0.9)
MONOCYTES NFR BLD AUTO: 1.2 % — LOW (ref 2–14)
NEUTROPHILS # BLD AUTO: 12.81 K/UL — HIGH (ref 1.8–7.4)
NEUTROPHILS NFR BLD AUTO: 92.6 % — HIGH (ref 43–77)
PLATELET # BLD AUTO: 167 K/UL — SIGNIFICANT CHANGE UP (ref 150–400)
POTASSIUM SERPL-MCNC: 4.5 MMOL/L — SIGNIFICANT CHANGE UP (ref 3.5–5.3)
POTASSIUM SERPL-SCNC: 4.5 MMOL/L — SIGNIFICANT CHANGE UP (ref 3.5–5.3)
RBC # BLD: 5.03 M/UL — SIGNIFICANT CHANGE UP (ref 4.2–5.8)
RBC # FLD: 12.7 % — SIGNIFICANT CHANGE UP (ref 10.3–14.5)
SODIUM SERPL-SCNC: 137 MMOL/L — SIGNIFICANT CHANGE UP (ref 135–145)
WBC # BLD: 13.82 K/UL — HIGH (ref 3.8–10.5)
WBC # FLD AUTO: 13.82 K/UL — HIGH (ref 3.8–10.5)

## 2021-06-25 PROCEDURE — C1889: CPT

## 2021-06-25 PROCEDURE — C9399: CPT

## 2021-06-25 PROCEDURE — 88307 TISSUE EXAM BY PATHOLOGIST: CPT | Mod: 26

## 2021-06-25 PROCEDURE — 88309 TISSUE EXAM BY PATHOLOGIST: CPT

## 2021-06-25 PROCEDURE — 99221 1ST HOSP IP/OBS SF/LOW 40: CPT

## 2021-06-25 PROCEDURE — 88309 TISSUE EXAM BY PATHOLOGIST: CPT | Mod: 26

## 2021-06-25 PROCEDURE — 55866 LAPS SURG PRST8ECT RPBIC RAD: CPT | Mod: AS

## 2021-06-25 PROCEDURE — S2900: CPT

## 2021-06-25 PROCEDURE — 88307 TISSUE EXAM BY PATHOLOGIST: CPT

## 2021-06-25 PROCEDURE — 36415 COLL VENOUS BLD VENIPUNCTURE: CPT

## 2021-06-25 PROCEDURE — 85025 COMPLETE CBC W/AUTO DIFF WBC: CPT

## 2021-06-25 PROCEDURE — 88344 IMHCHEM/IMCYTCHM EA MLT ANTB: CPT | Mod: 26

## 2021-06-25 PROCEDURE — 88344 IMHCHEM/IMCYTCHM EA MLT ANTB: CPT

## 2021-06-25 PROCEDURE — 55866 LAPS SURG PRST8ECT RPBIC RAD: CPT

## 2021-06-25 PROCEDURE — 38571 LAPAROSCOPY LYMPHADENECTOMY: CPT

## 2021-06-25 PROCEDURE — 80048 BASIC METABOLIC PNL TOTAL CA: CPT

## 2021-06-25 PROCEDURE — 38571 LAPAROSCOPY LYMPHADENECTOMY: CPT | Mod: AS

## 2021-06-25 RX ORDER — ATORVASTATIN CALCIUM 80 MG/1
80 TABLET, FILM COATED ORAL AT BEDTIME
Refills: 0 | Status: DISCONTINUED | OUTPATIENT
Start: 2021-06-25 | End: 2021-06-26

## 2021-06-25 RX ORDER — ASPIRIN/CALCIUM CARB/MAGNESIUM 324 MG
81 TABLET ORAL DAILY
Refills: 0 | Status: DISCONTINUED | OUTPATIENT
Start: 2021-06-27 | End: 2021-06-26

## 2021-06-25 RX ORDER — LEVOTHYROXINE SODIUM 125 MCG
125 TABLET ORAL DAILY
Refills: 0 | Status: DISCONTINUED | OUTPATIENT
Start: 2021-06-25 | End: 2021-06-26

## 2021-06-25 RX ORDER — LIDOCAINE 4 G/100G
1 CREAM TOPICAL THREE TIMES A DAY
Refills: 0 | Status: DISCONTINUED | OUTPATIENT
Start: 2021-06-25 | End: 2021-06-26

## 2021-06-25 RX ORDER — LOSARTAN POTASSIUM 100 MG/1
25 TABLET, FILM COATED ORAL DAILY
Refills: 0 | Status: DISCONTINUED | OUTPATIENT
Start: 2021-06-25 | End: 2021-06-26

## 2021-06-25 RX ORDER — OXYCODONE HYDROCHLORIDE 5 MG/1
10 TABLET ORAL ONCE
Refills: 0 | Status: DISCONTINUED | OUTPATIENT
Start: 2021-06-25 | End: 2021-06-25

## 2021-06-25 RX ORDER — ROSUVASTATIN CALCIUM 5 MG/1
1 TABLET ORAL
Qty: 0 | Refills: 0 | DISCHARGE

## 2021-06-25 RX ORDER — HEPARIN SODIUM 5000 [USP'U]/ML
5000 INJECTION INTRAVENOUS; SUBCUTANEOUS EVERY 8 HOURS
Refills: 0 | Status: CANCELLED | OUTPATIENT
Start: 2021-06-28 | End: 2021-06-26

## 2021-06-25 RX ORDER — ONDANSETRON 8 MG/1
4 TABLET, FILM COATED ORAL EVERY 6 HOURS
Refills: 0 | Status: DISCONTINUED | OUTPATIENT
Start: 2021-06-25 | End: 2021-06-26

## 2021-06-25 RX ORDER — TELMISARTAN 20 MG/1
1 TABLET ORAL
Qty: 0 | Refills: 0 | DISCHARGE

## 2021-06-25 RX ORDER — SODIUM CHLORIDE 9 MG/ML
1000 INJECTION INTRAMUSCULAR; INTRAVENOUS; SUBCUTANEOUS
Refills: 0 | Status: DISCONTINUED | OUTPATIENT
Start: 2021-06-25 | End: 2021-06-26

## 2021-06-25 RX ORDER — ONDANSETRON 8 MG/1
4 TABLET, FILM COATED ORAL ONCE
Refills: 0 | Status: DISCONTINUED | OUTPATIENT
Start: 2021-06-25 | End: 2021-06-25

## 2021-06-25 RX ORDER — ASPIRIN/CALCIUM CARB/MAGNESIUM 324 MG
1 TABLET ORAL
Qty: 0 | Refills: 0 | DISCHARGE

## 2021-06-25 RX ORDER — SODIUM CHLORIDE 9 MG/ML
1000 INJECTION, SOLUTION INTRAVENOUS
Refills: 0 | Status: DISCONTINUED | OUTPATIENT
Start: 2021-06-25 | End: 2021-06-25

## 2021-06-25 RX ORDER — CHOLECALCIFEROL (VITAMIN D3) 125 MCG
1 CAPSULE ORAL
Qty: 0 | Refills: 0 | DISCHARGE

## 2021-06-25 RX ORDER — FENTANYL CITRATE 50 UG/ML
50 INJECTION INTRAVENOUS
Refills: 0 | Status: DISCONTINUED | OUTPATIENT
Start: 2021-06-25 | End: 2021-06-25

## 2021-06-25 RX ORDER — ACETAMINOPHEN 500 MG
1000 TABLET ORAL ONCE
Refills: 0 | Status: COMPLETED | OUTPATIENT
Start: 2021-06-26 | End: 2021-06-26

## 2021-06-25 RX ORDER — OXYCODONE HYDROCHLORIDE 5 MG/1
5 TABLET ORAL EVERY 6 HOURS
Refills: 0 | Status: DISCONTINUED | OUTPATIENT
Start: 2021-06-25 | End: 2021-06-26

## 2021-06-25 RX ORDER — LEVOTHYROXINE SODIUM 125 MCG
1 TABLET ORAL
Qty: 0 | Refills: 0 | DISCHARGE

## 2021-06-25 RX ORDER — CEFAZOLIN SODIUM 1 G
2000 VIAL (EA) INJECTION EVERY 8 HOURS
Refills: 0 | Status: COMPLETED | OUTPATIENT
Start: 2021-06-25 | End: 2021-06-26

## 2021-06-25 RX ORDER — ACETAMINOPHEN 500 MG
1000 TABLET ORAL ONCE
Refills: 0 | Status: DISCONTINUED | OUTPATIENT
Start: 2021-06-25 | End: 2021-06-26

## 2021-06-25 RX ORDER — HYDROMORPHONE HYDROCHLORIDE 2 MG/ML
0.5 INJECTION INTRAMUSCULAR; INTRAVENOUS; SUBCUTANEOUS EVERY 4 HOURS
Refills: 0 | Status: DISCONTINUED | OUTPATIENT
Start: 2021-06-25 | End: 2021-06-26

## 2021-06-25 RX ADMIN — SODIUM CHLORIDE 100 MILLILITER(S): 9 INJECTION INTRAMUSCULAR; INTRAVENOUS; SUBCUTANEOUS at 18:16

## 2021-06-25 RX ADMIN — SODIUM CHLORIDE 75 MILLILITER(S): 9 INJECTION, SOLUTION INTRAVENOUS at 16:16

## 2021-06-25 RX ADMIN — ATORVASTATIN CALCIUM 80 MILLIGRAM(S): 80 TABLET, FILM COATED ORAL at 21:15

## 2021-06-25 RX ADMIN — LIDOCAINE 1 APPLICATION(S): 4 CREAM TOPICAL at 21:15

## 2021-06-25 RX ADMIN — Medication 100 MILLIGRAM(S): at 21:16

## 2021-06-25 NOTE — PATIENT PROFILE ADULT - NSPROPTRIGHTSUPPORTPERSON_GEN_A_NUR
same name as above [Alert] : alert [Acute Distress] : no acute distress [Normocephalic] : normocephalic [PERRL] : PERRL [Flat Open Anterior Babcock] : flat open anterior fontanelle [Red Reflex Bilateral] : red reflex bilateral [Normally Placed Ears] : normally placed ears [Auricles Well Formed] : auricles well formed [Clear Tympanic membranes] : clear tympanic membranes [Light reflex present] : light reflex present [Bony landmarks visible] : bony landmarks visible [Discharge] : no discharge [Palate Intact] : palate intact [Nares Patent] : nares patent [Uvula Midline] : uvula midline [Supple, full passive range of motion] : supple, full passive range of motion [Palpable Masses] : no palpable masses [Symmetric Chest Rise] : symmetric chest rise [Regular Rate and Rhythm] : regular rate and rhythm [Clear to Auscultation Bilaterally] : clear to auscultation bilaterally [S1, S2 present] : S1, S2 present [Murmurs] : no murmurs [+2 Femoral Pulses] : +2 femoral pulses [Soft] : soft [Distended] : not distended [Tender] : nontender [Bowel Sounds] : bowel sounds present [Splenomegaly] : no splenomegaly [Hepatomegaly] : no hepatomegaly [Normal external genitailia] : normal external genitalia [Central Urethral Opening] : central urethral opening [Testicles Descended Bilaterally] : testicles descended bilaterally [Normally Placed] : normally placed [Rangel-Ortolani] : negative Rangel-Ortolani [No Abnormal Lymph Nodes Palpated] : no abnormal lymph nodes palpated [Spinal Dimple] : no spinal dimple [Symmetric Flexed Extremities] : symmetric flexed extremities [Startle Reflex] : startle reflex present [Tuft of Hair] : no tuft of hair [Suck Reflex] : suck reflex present [Rooting] : rooting reflex present [Palmar Grasp] : palmar grasp reflex present [Symmetric Syed] : symmetric [place] [Plantar Grasp] : plantar grasp reflex present [Rash and/or lesion present] : no rash/lesion [Jaundice] : no jaundice

## 2021-06-25 NOTE — BRIEF OPERATIVE NOTE - NSICDXBRIEFPROCEDURE_GEN_ALL_CORE_FT
PROCEDURES:  Robotic radical prostatectomy 25-Jun-2021 16:16:02 bilateral pelvic lymph node dissection José Miguel Gaines

## 2021-06-25 NOTE — CONSULT NOTE ADULT - SUBJECTIVE AND OBJECTIVE BOX
----- Message from Jocy Snow MD sent at 8/13/2019  8:55 AM EDT -----  Here are labs for bariatrics. There is one still missing (MMA) so wait on that one before sending.    ----- Message -----  From: Lab, Background User  Sent: 8/13/2019   1:42 AM  To: Jocy Snow MD       PCP- DR Galeas    CC- prostate ca      HPI:  65yo/M with PMH HTN, hyperlipidemia, hypothyroidism found to have elevated PSA as outpatient testing, underwent prostate biopsy that was positive for prostate ca and presented for elective robotic prostatectomy. Medical consult called for postop medical management    PMH- as above  PSH- lumbar discectomy  SOc hx- denies smoking, alcohol socially  Fam hx- f had CAD and Parkinson's, m is alive with cardiac issues    6/25/21- seen in PACU, lethargic from anesthesia    Review of system- All 10 systems reviewed and is as per HPI otherwise negative.     T(C): 37.5 (06-25-21 @ 18:03), Max: 37.5 (06-25-21 @ 18:03)  HR: 63 (06-25-21 @ 18:03) (62 - 79)  BP: 137/73 (06-25-21 @ 18:03) (114/59 - 143/69)  RR: 18 (06-25-21 @ 18:03) (13 - 20)  SpO2: 100% (06-25-21 @ 18:03) (100% - 100%)  Wt(kg): --    LABS:                        15.5   13.82 )-----------( x        ( 25 Jun 2021 16:32 )             47.0     06-25    137  |  107  |  13  ----------------------------<  141<H>  4.5   |  25  |  1.21    Ca    8.6      25 Jun 2021 16:32    RADIOLOGY & ADDITIONAL TESTS:      PHYSICAL EXAM:  GENERAL: NAD, well-groomed, well-developed  HEAD:  Atraumatic, Normocephalic  EYES: EOMI, PERRLA, conjunctiva and sclera clear  HEENT: Moist mucous membranes  NECK: Supple, No JVD  NERVOUS SYSTEM:  Lethargic from anesthesia  CHEST/LUNG: Clear to auscultation bilaterally; No rales, rhonchi, wheezing, or rubs  HEART: Regular rate and rhythm; No murmurs, rubs, or gallops  ABDOMEN: Robotic sites are clean, +LITTLE drain on the right  GENITOURINARY- [place] with yellow urine  EXTREMITIES:  2+ Peripheral Pulses, No clubbing, cyanosis, or edema  MUSCULOSKELTAL- No muscle tenderness, Muscle tone normal, No joint tenderness, no Joint swelling, Joint range of motion-normal  SKIN-no rash, no lesion    Daily Height in cm: 185.42 (25 Jun 2021 09:55)      MEDICATIONS  (STANDING):  acetaminophen  IVPB .. 1000 milliGRAM(s) IV Intermittent once  atorvastatin 80 milliGRAM(s) Oral at bedtime  ceFAZolin   IVPB 2000 milliGRAM(s) IV Intermittent every 8 hours  levothyroxine 125 MICROGram(s) Oral daily  losartan 25 milliGRAM(s) Oral daily  sodium chloride 0.9%. 1000 milliLiter(s) (100 mL/Hr) IV Continuous <Continuous>    MEDICATIONS  (PRN):  HYDROmorphone  Injectable 0.5 milliGRAM(s) IV Push every 4 hours PRN Severe Pain (7 - 10)  ondansetron Injectable 4 milliGRAM(s) IV Push every 6 hours PRN Nausea  oxyCODONE    IR 5 milliGRAM(s) Oral every 6 hours PRN Moderate Pain (4 - 6)    Assessment/Plan  #Prostate ca s/p robotic radical prostatectomy  Urology f/u appreciated  Casanova in  Periop abx per protocol  Monitor LITTLE drain output  Pain meds prn  OOB to ambulate  Incentive spirometry    #Hypothyroidism- cont synthroid    #HTN/hyperlipidemia  Cont home meds    #DVT proph- ambulate, venodynes    #Dispo- thank you for consult, will follow with you.

## 2021-06-26 ENCOUNTER — TRANSCRIPTION ENCOUNTER (OUTPATIENT)
Age: 66
End: 2021-06-26

## 2021-06-26 VITALS — RESPIRATION RATE: 18 BRPM | OXYGEN SATURATION: 100 %

## 2021-06-26 LAB
ANION GAP SERPL CALC-SCNC: 6 MMOL/L — SIGNIFICANT CHANGE UP (ref 5–17)
BASOPHILS # BLD AUTO: 0.02 K/UL — SIGNIFICANT CHANGE UP (ref 0–0.2)
BASOPHILS NFR BLD AUTO: 0.1 % — SIGNIFICANT CHANGE UP (ref 0–2)
BUN SERPL-MCNC: 12 MG/DL — SIGNIFICANT CHANGE UP (ref 7–23)
CALCIUM SERPL-MCNC: 8.1 MG/DL — LOW (ref 8.5–10.1)
CHLORIDE SERPL-SCNC: 109 MMOL/L — HIGH (ref 96–108)
CO2 SERPL-SCNC: 25 MMOL/L — SIGNIFICANT CHANGE UP (ref 22–31)
CREAT SERPL-MCNC: 0.98 MG/DL — SIGNIFICANT CHANGE UP (ref 0.5–1.3)
EOSINOPHIL # BLD AUTO: 0 K/UL — SIGNIFICANT CHANGE UP (ref 0–0.5)
EOSINOPHIL NFR BLD AUTO: 0 % — SIGNIFICANT CHANGE UP (ref 0–6)
GLUCOSE SERPL-MCNC: 113 MG/DL — HIGH (ref 70–99)
HCT VFR BLD CALC: 43.4 % — SIGNIFICANT CHANGE UP (ref 39–50)
HGB BLD-MCNC: 14.4 G/DL — SIGNIFICANT CHANGE UP (ref 13–17)
IMM GRANULOCYTES NFR BLD AUTO: 0.5 % — SIGNIFICANT CHANGE UP (ref 0–1.5)
LYMPHOCYTES # BLD AUTO: 0.89 K/UL — LOW (ref 1–3.3)
LYMPHOCYTES # BLD AUTO: 5.9 % — LOW (ref 13–44)
MCHC RBC-ENTMCNC: 30.8 PG — SIGNIFICANT CHANGE UP (ref 27–34)
MCHC RBC-ENTMCNC: 33.2 GM/DL — SIGNIFICANT CHANGE UP (ref 32–36)
MCV RBC AUTO: 92.7 FL — SIGNIFICANT CHANGE UP (ref 80–100)
MONOCYTES # BLD AUTO: 0.98 K/UL — HIGH (ref 0–0.9)
MONOCYTES NFR BLD AUTO: 6.4 % — SIGNIFICANT CHANGE UP (ref 2–14)
NEUTROPHILS # BLD AUTO: 13.25 K/UL — HIGH (ref 1.8–7.4)
NEUTROPHILS NFR BLD AUTO: 87.1 % — HIGH (ref 43–77)
PLATELET # BLD AUTO: 184 K/UL — SIGNIFICANT CHANGE UP (ref 150–400)
POTASSIUM SERPL-MCNC: 4.4 MMOL/L — SIGNIFICANT CHANGE UP (ref 3.5–5.3)
POTASSIUM SERPL-SCNC: 4.4 MMOL/L — SIGNIFICANT CHANGE UP (ref 3.5–5.3)
RBC # BLD: 4.68 M/UL — SIGNIFICANT CHANGE UP (ref 4.2–5.8)
RBC # FLD: 12.8 % — SIGNIFICANT CHANGE UP (ref 10.3–14.5)
SODIUM SERPL-SCNC: 140 MMOL/L — SIGNIFICANT CHANGE UP (ref 135–145)
WBC # BLD: 15.21 K/UL — HIGH (ref 3.8–10.5)
WBC # FLD AUTO: 15.21 K/UL — HIGH (ref 3.8–10.5)

## 2021-06-26 PROCEDURE — 99232 SBSQ HOSP IP/OBS MODERATE 35: CPT

## 2021-06-26 RX ORDER — ATORVASTATIN CALCIUM 80 MG/1
1 TABLET, FILM COATED ORAL
Qty: 0 | Refills: 0 | DISCHARGE
Start: 2021-06-26

## 2021-06-26 RX ORDER — LOSARTAN POTASSIUM 100 MG/1
1 TABLET, FILM COATED ORAL
Qty: 0 | Refills: 0 | DISCHARGE
Start: 2021-06-26

## 2021-06-26 RX ORDER — OXYCODONE HYDROCHLORIDE 5 MG/1
1 TABLET ORAL
Qty: 12 | Refills: 0
Start: 2021-06-26

## 2021-06-26 RX ADMIN — Medication 1000 MILLIGRAM(S): at 00:45

## 2021-06-26 RX ADMIN — Medication 100 MILLIGRAM(S): at 05:18

## 2021-06-26 RX ADMIN — LIDOCAINE 1 APPLICATION(S): 4 CREAM TOPICAL at 05:18

## 2021-06-26 RX ADMIN — LOSARTAN POTASSIUM 25 MILLIGRAM(S): 100 TABLET, FILM COATED ORAL at 09:35

## 2021-06-26 RX ADMIN — Medication 125 MICROGRAM(S): at 05:19

## 2021-06-26 RX ADMIN — Medication 400 MILLIGRAM(S): at 00:27

## 2021-06-26 RX ADMIN — SODIUM CHLORIDE 100 MILLILITER(S): 9 INJECTION INTRAMUSCULAR; INTRAVENOUS; SUBCUTANEOUS at 04:27

## 2021-06-26 NOTE — DISCHARGE NOTE NURSING/CASE MANAGEMENT/SOCIAL WORK - PATIENT PORTAL LINK FT
You can access the FollowMyHealth Patient Portal offered by [facility] by registering at the following website: [email or web address]. By joining [facility]’s FollowMyHealth portal, you will also be able to view your health information using other applications (apps) compatible with our system.

## 2021-06-26 NOTE — DISCHARGE NOTE PROVIDER - NSDCFUADDINST_GEN_ALL_CORE_FT
Please call Dr. Wallis's office for a follow up appointment.  A prescription for pain medicine was sent to your pharmacy.  You may shower, do not scrub incisions.  Keep drain site covered when showering for the next few days and replace dressing.  Replace dressing when wet.

## 2021-06-26 NOTE — DISCHARGE NOTE PROVIDER - NSDCMRMEDTOKEN_GEN_ALL_CORE_FT
aspirin 81 mg oral tablet: 1 tab(s) orally once a day. to stop 7 days prior to surgery.  atorvastatin 80 mg oral tablet: 1 tab(s) orally once a day (at bedtime)  losartan 25 mg oral tablet: 1 tab(s) orally once a day  Micardis 20 mg oral tablet: 1 tab(s) orally once a day (in the evening)  oxyCODONE 5 mg oral tablet: 1 tab(s) orally every 6 hours, As Needed -Moderate Pain (4 - 6) - for moderate pain MDD:4   rosuvastatin 20 mg oral tablet: 1 tab(s) orally once a day (in the evening)  Synthroid 125 mcg (0.125 mg) oral tablet: 1 tab(s) orally once a day (in the evening)  Vitamin D3 2000 intl units (50 mcg) oral tablet: 1 tab(s) orally once a day

## 2021-06-26 NOTE — DISCHARGE NOTE PROVIDER - HOSPITAL COURSE
Pt admitted on 6/25 and underwent a robotic assisted prostatectomy.  Pt was admitted overnight for monitoring of urine and drain output.  On post operative day 1, patient was ambulating and pain controlled.  LITTLE drain was removed and patient deemed stable for discharge.

## 2021-06-26 NOTE — PROGRESS NOTE ADULT - ASSESSMENT
65 yo male POD#1 s/p robotic assisted radical prostatectomy, doing well.  -Await labs, if wnl likely d/c home today  -Follow U/O and LITTLE outputs  -Heparin for dvt ppx

## 2021-06-26 NOTE — DISCHARGE NOTE PROVIDER - CARE PROVIDER_API CALL
Slava Wallis)  Urology  [address] [place] [address]  Phone: [phone]  Fax: [phone]  Follow Up Time:

## 2021-06-26 NOTE — PROGRESS NOTE ADULT - SUBJECTIVE AND OBJECTIVE BOX
Pt has no complaints.  Minimal pain.  Ambulating in hallway this AM.  Tolerating regular diet.      Vital Signs Last 24 Hrs  T(C): 36.7 (26 Jun 2021 04:26), Max: 37.5 (25 Jun 2021 18:03)  T(F): 98 (26 Jun 2021 04:26), Max: 99.5 (25 Jun 2021 18:03)  HR: 66 (26 Jun 2021 04:26) (62 - 79)  BP: 124/61 (26 Jun 2021 04:26) (114/59 - 143/69)  BP(mean): --  RR: 18 (26 Jun 2021 04:26) (13 - 20)  SpO2: 100% (26 Jun 2021 04:26) (100% - 100%)    Gen- appears comfortable  CV- RRR  Chest- Cta bilat  Abd- soft NTND, incisions intact with dermabond, hypoactive BS     avery-1400 clear    LITTLE-70cc serosang  Ext- No edema    Labs-              
Urology Post-op Progress Note:    Patient seen and examined. Found resting in bed without complaints. Pain is adequately controlled, tolerating diet, not passing gas yet, avery draining yellow urine, Little drain with minimal SS drainage. Denies SOB/CP/N/V.     Vital Signs Last 24 Hrs  T(C): 37.5 (25 Jun 2021 18:03), Max: 37.5 (25 Jun 2021 18:03)  T(F): 99.5 (25 Jun 2021 18:03), Max: 99.5 (25 Jun 2021 18:03)  HR: 63 (25 Jun 2021 18:03) (62 - 79)  BP: 137/73 (25 Jun 2021 18:03) (114/59 - 143/69)  BP(mean): --  RR: 18 (25 Jun 2021 18:03) (13 - 20)  SpO2: 100% (25 Jun 2021 18:03) (100% - 100%)    I&O's Summary    25 Jun 2021 07:01  -  25 Jun 2021 20:15  --------------------------------------------------------  IN: 1486 mL / OUT: 280 mL / NET: 1206 mL        Physical Exam:  GEN: Alert resting in bed in NAD  HEENT: NC/AT, PERRL   RESP: LSCTA, B/L Chest exp  CV: RRR, S1S2  ABD: Soft, Flat, NT, ND, +BS, Incisions CDI, LITTLE with minimal SS output, avery with yellow clear urine  EXT: +2 pulses, no peripheral edema  NEURO: MANZO, FC, A&Ox3  PSYCH: appropriate mood and behavior                          15.5   13.82 )-----------( 167      ( 25 Jun 2021 16:32 )             47.0       06-25    137  |  107  |  13  ----------------------------<  141<H>  4.5   |  25  |  1.21    Ca    8.6      25 Jun 2021 16:32        A/P: This is a 66y Male POD#0 s/p robotic assisted radical prostatectomy. Patient remains HD stable and doing well with no complaints.    Plan:  DVT prophylaxis/OOB  Incentive spirometry  Strict I&O's  Analgesia and antiemetics as needed  Diet  Monitor VS, temp and WBC  AM labs  
PCP- DR Galeas    CC- prostate ca      HPI:  65yo/M with PMH HTN, hyperlipidemia, hypothyroidism found to have elevated PSA as outpatient testing, underwent prostate biopsy that was positive for prostate ca and presented for elective robotic prostatectomy. Medical consult called for postop medical management    PMH- as above  PSH- lumbar discectomy  SOc hx- denies smoking, alcohol socially  Fam hx- f had CAD and Parkinson's, m is alive with cardiac issues    6/25/21- seen in PACU, lethargic from anesthesia  6/26/21- feeling good today. Pain is controlled. LITTLE drain removed. Ambulating. Wants to go home    Review of system- All 10 systems reviewed and is as per HPI otherwise negative.     Vital Signs Last 24 Hrs  T(C): 36.8 (26 Jun 2021 07:57), Max: 37.5 (25 Jun 2021 18:03)  T(F): 98.3 (26 Jun 2021 07:57), Max: 99.5 (25 Jun 2021 18:03)  HR: 62 (26 Jun 2021 07:57) (62 - 79)  BP: 116/73 (26 Jun 2021 07:57) (114/59 - 143/69)  BP(mean): --  RR: 18 (26 Jun 2021 10:00) (13 - 20)  SpO2: 100% (26 Jun 2021 10:00) (98% - 100%)    LABS:                                   14.4   15.21 )-----------( 184      ( 26 Jun 2021 07:31 )             43.4     26 Jun 2021 07:31    140    |  109    |  12     ----------------------------<  113    4.4     |  25     |  0.98     Ca    8.1        26 Jun 2021 07:31    RADIOLOGY & ADDITIONAL TESTS:      PHYSICAL EXAM:  GENERAL: NAD, well-groomed, well-developed  HEAD:  Atraumatic, Normocephalic  EYES: EOMI, PERRLA, conjunctiva and sclera clear  HEENT: Moist mucous membranes  NECK: Supple, No JVD  NERVOUS SYSTEM:  Lethargic from anesthesia  CHEST/LUNG: Clear to auscultation bilaterally; No rales, rhonchi, wheezing, or rubs  HEART: Regular rate and rhythm; No murmurs, rubs, or gallops  ABDOMEN: Robotic sites are clean, LITTLE drain removed  GENITOURINARY- Casanova with yellow urine  EXTREMITIES:  2+ Peripheral Pulses, No clubbing, cyanosis, or edema  MUSCULOSKELTAL- No muscle tenderness, Muscle tone normal, No joint tenderness, no Joint swelling, Joint range of motion-normal  SKIN-no rash, no lesion    Daily Height in cm: 185.42 (25 Jun 2021 09:55)      MEDICATIONS  (STANDING):  acetaminophen  IVPB .. 1000 milliGRAM(s) IV Intermittent once  atorvastatin 80 milliGRAM(s) Oral at bedtime  levothyroxine 125 MICROGram(s) Oral daily  lidocaine 2% Gel 1 Application(s) Topical three times a day  losartan 25 milliGRAM(s) Oral daily  sodium chloride 0.9%. 1000 milliLiter(s) (100 mL/Hr) IV Continuous <Continuous>    MEDICATIONS  (PRN):  HYDROmorphone  Injectable 0.5 milliGRAM(s) IV Push every 4 hours PRN Severe Pain (7 - 10)  ondansetron Injectable 4 milliGRAM(s) IV Push every 6 hours PRN Nausea  oxyCODONE    IR 5 milliGRAM(s) Oral every 6 hours PRN Moderate Pain (4 - 6)    Assessment/Plan  #Prostate ca s/p robotic radical prostatectomy  Urology f/u appreciated  Casanova to leg bag upon discharge  Periop abx per protocol  LITTLE drain removed  Pain meds prn  Ambulating  Incentive spirometry    #Hypothyroidism- cont synthroid    #HTN/hyperlipidemia  Cont home meds    #DVT proph- ambulate, venodynes    #Dispo- likely home later on today. D/w pt

## 2021-06-30 PROBLEM — E78.5 HYPERLIPIDEMIA, UNSPECIFIED: Chronic | Status: ACTIVE | Noted: 2021-06-16

## 2021-06-30 PROBLEM — Z92.29 PERSONAL HISTORY OF OTHER DRUG THERAPY: Chronic | Status: ACTIVE | Noted: 2021-06-16

## 2021-06-30 PROBLEM — E05.00 THYROTOXICOSIS WITH DIFFUSE GOITER WITHOUT THYROTOXIC CRISIS OR STORM: Chronic | Status: ACTIVE | Noted: 2021-06-16

## 2021-06-30 PROBLEM — M51.26 OTHER INTERVERTEBRAL DISC DISPLACEMENT, LUMBAR REGION: Chronic | Status: ACTIVE | Noted: 2021-06-16

## 2021-06-30 PROBLEM — I10 ESSENTIAL (PRIMARY) HYPERTENSION: Chronic | Status: ACTIVE | Noted: 2021-06-16

## 2021-06-30 PROBLEM — C61 MALIGNANT NEOPLASM OF PROSTATE: Chronic | Status: ACTIVE | Noted: 2021-06-16

## 2021-06-30 PROBLEM — E05.90 THYROTOXICOSIS, UNSPECIFIED WITHOUT THYROTOXIC CRISIS OR STORM: Chronic | Status: ACTIVE | Noted: 2021-06-16

## 2021-07-02 ENCOUNTER — APPOINTMENT (OUTPATIENT)
Dept: UROLOGY | Facility: CLINIC | Age: 66
End: 2021-07-02
Payer: COMMERCIAL

## 2021-07-02 VITALS
BODY MASS INDEX: 28.23 KG/M2 | WEIGHT: 213 LBS | HEIGHT: 73 IN | SYSTOLIC BLOOD PRESSURE: 124 MMHG | DIASTOLIC BLOOD PRESSURE: 82 MMHG | HEART RATE: 77 BPM | OXYGEN SATURATION: 97 %

## 2021-07-02 PROCEDURE — 99024 POSTOP FOLLOW-UP VISIT: CPT

## 2021-07-02 NOTE — END OF VISIT
[FreeTextEntry3] : The catheter was withdrawn without incident and I gave him ciprofloxacin 500 mg twice daily for the next 4 days.  He will follow-up with his operating surgeon next week

## 2021-07-02 NOTE — PHYSICAL EXAM
[General Appearance - Well Developed] : well developed [General Appearance - Well Nourished] : well nourished [Normal Appearance] : normal appearance [Well Groomed] : well groomed [General Appearance - In No Acute Distress] : no acute distress [Abdomen Soft] : soft [Abdomen Tenderness] : non-tender [Costovertebral Angle Tenderness] : no ~M costovertebral angle tenderness [Urethral Meatus] : meatus normal [Urinary Bladder Findings] : the bladder was normal on palpation [Scrotum] : the scrotum was normal [Testes Mass (___cm)] : there were no testicular masses [No Prostate Nodules] : no prostate nodules [FreeTextEntry1] : The prostate is surgically absent [Edema] : no peripheral edema [] : no respiratory distress [Respiration, Rhythm And Depth] : normal respiratory rhythm and effort [Exaggerated Use Of Accessory Muscles For Inspiration] : no accessory muscle use [Oriented To Time, Place, And Person] : oriented to person, place, and time [Affect] : the affect was normal [Mood] : the mood was normal [Not Anxious] : not anxious [Normal Station and Gait] : the gait and station were normal for the patient's age [No Focal Deficits] : no focal deficits [No Palpable Adenopathy] : no palpable adenopathy

## 2021-07-02 NOTE — HISTORY OF PRESENT ILLNESS
[FreeTextEntry1] : This patient is status post radical prostatectomy without any postoperative problems.  Is here today for catheter removal.  His wife is with him

## 2021-07-07 ENCOUNTER — APPOINTMENT (OUTPATIENT)
Dept: UROLOGY | Facility: CLINIC | Age: 66
End: 2021-07-07
Payer: COMMERCIAL

## 2021-07-07 VITALS
DIASTOLIC BLOOD PRESSURE: 85 MMHG | HEIGHT: 73 IN | HEART RATE: 77 BPM | SYSTOLIC BLOOD PRESSURE: 124 MMHG | TEMPERATURE: 97.6 F | OXYGEN SATURATION: 96 % | BODY MASS INDEX: 25.91 KG/M2 | WEIGHT: 195.5 LBS

## 2021-07-07 PROCEDURE — 99024 POSTOP FOLLOW-UP VISIT: CPT

## 2021-07-07 NOTE — PHYSICAL EXAM
[General Appearance - Well Developed] : well developed [General Appearance - Well Nourished] : well nourished [Normal Appearance] : normal appearance [Well Groomed] : well groomed [General Appearance - In No Acute Distress] : no acute distress [Abdomen Tenderness] : non-tender [Abdomen Soft] : soft [Costovertebral Angle Tenderness] : no ~M costovertebral angle tenderness [Urethral Meatus] : meatus normal [Urinary Bladder Findings] : the bladder was normal on palpation [Scrotum] : the scrotum was normal [Testes Mass (___cm)] : there were no testicular masses [Edema] : no peripheral edema [] : no respiratory distress [Exaggerated Use Of Accessory Muscles For Inspiration] : no accessory muscle use [Respiration, Rhythm And Depth] : normal respiratory rhythm and effort [Oriented To Time, Place, And Person] : oriented to person, place, and time [Affect] : the affect was normal [Mood] : the mood was normal [Not Anxious] : not anxious [Normal Station and Gait] : the gait and station were normal for the patient's age [No Focal Deficits] : no focal deficits [No Palpable Adenopathy] : no palpable adenopathy [FreeTextEntry1] : wounds clean

## 2021-07-07 NOTE — HISTORY OF PRESENT ILLNESS
[FreeTextEntry1] : here for followup\par Feels well \par No complaints\par continent\par path is not back\par No pain\par

## 2021-07-23 ENCOUNTER — NON-APPOINTMENT (OUTPATIENT)
Age: 66
End: 2021-07-23

## 2021-07-26 LAB
PSA FREE FLD-MCNC: NORMAL %
PSA FREE SERPL-MCNC: <0.01 NG/ML
PSA SERPL-MCNC: <0.01 NG/ML

## 2021-07-28 ENCOUNTER — APPOINTMENT (OUTPATIENT)
Dept: UROLOGY | Facility: CLINIC | Age: 66
End: 2021-07-28
Payer: COMMERCIAL

## 2021-07-28 VITALS — BODY MASS INDEX: 25.84 KG/M2 | TEMPERATURE: 97.6 F | RESPIRATION RATE: 16 BRPM | WEIGHT: 195 LBS | HEIGHT: 73 IN

## 2021-07-28 PROCEDURE — 99024 POSTOP FOLLOW-UP VISIT: CPT

## 2021-07-28 NOTE — HISTORY OF PRESENT ILLNESS
[None] : no symptoms [FreeTextEntry1] : 67 yo presents today for a follow-up visit for prostate cancer. s/p RALP 6.25.21. \par Recent PSA <0.01.\par Pt. reports feeling well overall. \par Continues to perform Kegel exercises for urinary control.

## 2021-07-28 NOTE — ASSESSMENT
[FreeTextEntry1] : PSA undetectable. \par Rx for tadalafil 5 mg ordered for patient for penile rehabilitation after surgery.\par Repeat PSA in 3 months. \par Next follow-up appointment in 3 months after PSA completed. \par Encouraged patient to continue with Kegel exercises.

## 2021-07-28 NOTE — PHYSICAL EXAM
[Normal Appearance] : normal appearance [Well Groomed] : well groomed [General Appearance - In No Acute Distress] : no acute distress [Abdomen Soft] : soft [Urethral Meatus] : meatus normal [Skin Color & Pigmentation] : normal skin color and pigmentation [Edema] : no peripheral edema [] : no respiratory distress [Oriented To Time, Place, And Person] : oriented to person, place, and time [Affect] : the affect was normal [Normal Station and Gait] : the gait and station were normal for the patient's age [No Focal Deficits] : no focal deficits [No Palpable Adenopathy] : no palpable adenopathy [FreeTextEntry1] : abdominal incisions healed.

## 2021-09-10 RX ORDER — TADALAFIL 5 MG/1
5 TABLET ORAL
Qty: 30 | Refills: 2 | Status: DISCONTINUED | COMMUNITY
Start: 2021-07-28 | End: 2021-09-10

## 2021-10-08 LAB
PSA FREE FLD-MCNC: NORMAL %
PSA FREE SERPL-MCNC: <0.01 NG/ML
PSA SERPL-MCNC: <0.01 NG/ML

## 2021-10-13 ENCOUNTER — APPOINTMENT (OUTPATIENT)
Dept: UROLOGY | Facility: CLINIC | Age: 66
End: 2021-10-13
Payer: COMMERCIAL

## 2021-10-13 PROCEDURE — 99213 OFFICE O/P EST LOW 20 MIN: CPT

## 2021-10-13 NOTE — PHYSICAL EXAM
[General Appearance - Well Developed] : well developed [General Appearance - Well Nourished] : well nourished [Normal Appearance] : normal appearance [Well Groomed] : well groomed [General Appearance - In No Acute Distress] : no acute distress [Abdomen Soft] : soft [Abdomen Tenderness] : non-tender [Costovertebral Angle Tenderness] : no ~M costovertebral angle tenderness [FreeTextEntry1] : wounds healed [Urethral Meatus] : meatus normal [Urinary Bladder Findings] : the bladder was normal on palpation [Scrotum] : the scrotum was normal [Testes Mass (___cm)] : there were no testicular masses [Edema] : no peripheral edema [] : no respiratory distress [Respiration, Rhythm And Depth] : normal respiratory rhythm and effort [Exaggerated Use Of Accessory Muscles For Inspiration] : no accessory muscle use [Oriented To Time, Place, And Person] : oriented to person, place, and time [Affect] : the affect was normal [Mood] : the mood was normal [Not Anxious] : not anxious [Normal Station and Gait] : the gait and station were normal for the patient's age [No Focal Deficits] : no focal deficits [No Palpable Adenopathy] : no palpable adenopathy

## 2021-10-13 NOTE — HISTORY OF PRESENT ILLNESS
[FreeTextEntry1] : 67 yo presents today for prostate cancer. \par s/p RALP june 25 2021\par Feels well\par continent but still with ED\par No erections PSA undetectable

## 2021-10-13 NOTE — ASSESSMENT
[FreeTextEntry1] : discussed PSA which is undetectable in setting of [place] 9 CA\par We discussed options for ED treatment and kegel exercises\par Cialis script written\par 20 minute discussion regarding prostate cancer followup, functional outcomes and review of labs\par

## 2021-10-19 ENCOUNTER — RX RENEWAL (OUTPATIENT)
Age: 66
End: 2021-10-19

## 2022-01-11 LAB
PSA FREE FLD-MCNC: NORMAL %
PSA FREE SERPL-MCNC: <0.01 NG/ML
PSA SERPL-MCNC: <0.01 NG/ML

## 2022-01-19 ENCOUNTER — APPOINTMENT (OUTPATIENT)
Dept: UROLOGY | Facility: CLINIC | Age: 67
End: 2022-01-19
Payer: COMMERCIAL

## 2022-01-19 VITALS
HEIGHT: 73 IN | SYSTOLIC BLOOD PRESSURE: 144 MMHG | DIASTOLIC BLOOD PRESSURE: 91 MMHG | OXYGEN SATURATION: 96 % | BODY MASS INDEX: 29.29 KG/M2 | WEIGHT: 221 LBS | HEART RATE: 69 BPM

## 2022-01-19 PROCEDURE — 99213 OFFICE O/P EST LOW 20 MIN: CPT

## 2022-01-20 NOTE — HISTORY OF PRESENT ILLNESS
[None] : no symptoms [FreeTextEntry1] : 67 yo presents today for a follow-up appointment for prostate cancer. \par s/p RALP 6.25.21.\par Recent PSA <0.01 (1.10.22.)\par No acute urological issues at time of visit. \par

## 2022-01-20 NOTE — ASSESSMENT
[FreeTextEntry1] : PSA remains undetectable. \par Encouraged to continue Kegel exercises.\par Using tadalafil for ED.\par PSA in 3 months.\par Next follow-up appointment in 3 months. \par 20 minute discussion regarding prostate cancer followup, functional outcomes and review of labs\par

## 2022-04-04 LAB — PSA SERPL-MCNC: 0.01 NG/ML

## 2022-04-13 ENCOUNTER — APPOINTMENT (OUTPATIENT)
Dept: UROLOGY | Facility: CLINIC | Age: 67
End: 2022-04-13
Payer: MEDICARE

## 2022-04-13 VITALS
HEIGHT: 73 IN | SYSTOLIC BLOOD PRESSURE: 134 MMHG | WEIGHT: 221 LBS | BODY MASS INDEX: 29.29 KG/M2 | HEART RATE: 69 BPM | DIASTOLIC BLOOD PRESSURE: 85 MMHG | OXYGEN SATURATION: 96 %

## 2022-04-13 PROCEDURE — 99213 OFFICE O/P EST LOW 20 MIN: CPT

## 2022-04-13 RX ORDER — TADALAFIL 5 MG/1
5 TABLET ORAL
Qty: 30 | Refills: 3 | Status: DISCONTINUED | COMMUNITY
Start: 2022-01-19 | End: 2022-04-13

## 2022-04-13 NOTE — PHYSICAL EXAM
[Normal Appearance] : normal appearance [General Appearance - In No Acute Distress] : no acute distress [Abdomen Soft] : soft [Urethral Meatus] : meatus normal [Skin Color & Pigmentation] : normal skin color and pigmentation [Edema] : no peripheral edema [] : no respiratory distress [Oriented To Time, Place, And Person] : oriented to person, place, and time [Affect] : the affect was normal [Normal Station and Gait] : the gait and station were normal for the patient's age [No Focal Deficits] : no focal deficits [No Palpable Adenopathy] : no palpable adenopathy

## 2022-04-13 NOTE — HISTORY OF PRESENT ILLNESS
[None] : no symptoms [FreeTextEntry1] : 66 yo presents today for a follow-up appointment for prostate cancer.\par s/p RALP 6.25.21.\par Recent PSA 0.01 (4.4.22.)\par

## 2022-04-13 NOTE — ASSESSMENT
[FreeTextEntry1] : PSA remains undetectable. \par Currently on tadalafil 5 mg with mild results. Erection not strong enough for penetration. \par Changed medication to tadalafil 20 mg on demand.\par Medication explained to patient including the proper usage of medication. \par PSA in 3 months.\par Next follow-up in 3 months.\par 20 minute discussion regarding prostate cancer followup, functional outcomes and review of labs\par

## 2022-07-06 ENCOUNTER — LABORATORY RESULT (OUTPATIENT)
Age: 67
End: 2022-07-06

## 2022-07-13 ENCOUNTER — APPOINTMENT (OUTPATIENT)
Dept: UROLOGY | Facility: CLINIC | Age: 67
End: 2022-07-13

## 2022-07-13 VITALS
HEART RATE: 63 BPM | DIASTOLIC BLOOD PRESSURE: 78 MMHG | SYSTOLIC BLOOD PRESSURE: 146 MMHG | WEIGHT: 221 LBS | OXYGEN SATURATION: 98 % | HEIGHT: 73 IN | BODY MASS INDEX: 29.29 KG/M2

## 2022-07-13 PROCEDURE — 99213 OFFICE O/P EST LOW 20 MIN: CPT

## 2022-07-13 NOTE — ASSESSMENT
[FreeTextEntry1] : continent\par PSA 0.02\par Followup in 4 mos\par 20 minute discussion regarding prostate cancer followup, functional outcomes and review of labs\par

## 2022-10-20 LAB
PSA FREE FLD-MCNC: NORMAL %
PSA FREE SERPL-MCNC: <0.01 NG/ML
PSA SERPL-MCNC: 0.03 NG/ML

## 2022-11-16 ENCOUNTER — APPOINTMENT (OUTPATIENT)
Dept: UROLOGY | Facility: CLINIC | Age: 67
End: 2022-11-16

## 2022-11-16 VITALS
SYSTOLIC BLOOD PRESSURE: 145 MMHG | HEART RATE: 68 BPM | BODY MASS INDEX: 28.63 KG/M2 | HEIGHT: 73 IN | WEIGHT: 216 LBS | DIASTOLIC BLOOD PRESSURE: 85 MMHG | OXYGEN SATURATION: 97 %

## 2022-11-16 PROCEDURE — 99213 OFFICE O/P EST LOW 20 MIN: CPT

## 2022-11-16 NOTE — HISTORY OF PRESENT ILLNESS
[FreeTextEntry1] : 66 yo presents today for a follow-up appointment for prostate cancer. \par s/p RALP 6/2021\par Recent PSA 0.03 (10.14.22.)\par Pt. is doing well overall. \par Reports minimal stress incontinence. Denies wearing pads.\par Good erectile function. Does not need tadalafil often. \par

## 2022-11-16 NOTE — ASSESSMENT
[FreeTextEntry1] : PSA 0.03.\par Continue PSA surveillance. No intervention needed at this time. \par Reviewed pathology report with patient. 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 edge. If PSA continues to increase, may order a PSMA PET scan. \par \par Repeat PSA in 4 months. \par Next follow-up appointment in 4 months.

## 2023-03-13 ENCOUNTER — LABORATORY RESULT (OUTPATIENT)
Age: 68
End: 2023-03-13

## 2023-03-29 ENCOUNTER — APPOINTMENT (OUTPATIENT)
Dept: UROLOGY | Facility: CLINIC | Age: 68
End: 2023-03-29
Payer: MEDICARE

## 2023-03-29 VITALS
SYSTOLIC BLOOD PRESSURE: 126 MMHG | DIASTOLIC BLOOD PRESSURE: 81 MMHG | BODY MASS INDEX: 28.49 KG/M2 | RESPIRATION RATE: 17 BRPM | WEIGHT: 215 LBS | HEART RATE: 80 BPM | TEMPERATURE: 98 F | OXYGEN SATURATION: 95 % | HEIGHT: 73 IN

## 2023-03-29 PROCEDURE — 99213 OFFICE O/P EST LOW 20 MIN: CPT

## 2023-03-29 RX ORDER — DIAZEPAM 5 MG/1
5 TABLET ORAL
Qty: 1 | Refills: 0 | Status: DISCONTINUED | COMMUNITY
Start: 2021-04-15 | End: 2023-03-29

## 2023-03-29 RX ORDER — TADALAFIL 20 MG/1
20 TABLET ORAL
Qty: 20 | Refills: 0 | Status: DISCONTINUED | COMMUNITY
Start: 2021-09-10 | End: 2023-03-29

## 2023-03-29 RX ORDER — ENEMA 19; 7 G/133ML; G/133ML
7-19 ENEMA RECTAL
Qty: 1 | Refills: 0 | Status: DISCONTINUED | COMMUNITY
Start: 2021-04-15 | End: 2023-03-29

## 2023-04-02 NOTE — HISTORY OF PRESENT ILLNESS
[None] : no symptoms [FreeTextEntry1] : 66 yo presents today for a follow-up appointment for prostate cancer. \par s/p RALP 6/2021\par Recent PSA 0.04 (3.13.23.)\par Previous PSA 0.03 (10.14.22.)\par Pt. is doing well overall. \par Reports minimal stress incontinence. Wearing 1 pad/ day.\par Good erectile function. Tadalafil 5 mg daily.  \par

## 2023-04-02 NOTE — ASSESSMENT
[FreeTextEntry1] : Discussed steady rise of PSA. \par High grade prostate cancer GS 9.\par Explained to patient that there maybe possibility of adjuvant radiation. \par Answered all questions and addressed all concerns.\par Repeat PSA in 3 months. \par Next follow-up appointment in 3 months.

## 2023-06-28 ENCOUNTER — APPOINTMENT (OUTPATIENT)
Dept: UROLOGY | Facility: CLINIC | Age: 68
End: 2023-06-28
Payer: MEDICARE

## 2023-06-28 PROCEDURE — 99214 OFFICE O/P EST MOD 30 MIN: CPT

## 2023-06-28 NOTE — PHYSICAL EXAM
[General Appearance - Well Developed] : well developed [General Appearance - Well Nourished] : well nourished [Normal Appearance] : normal appearance [Skin Color & Pigmentation] : normal skin color and pigmentation [Edema] : no peripheral edema [] : no respiratory distress [Exaggerated Use Of Accessory Muscles For Inspiration] : no accessory muscle use [Oriented To Time, Place, And Person] : oriented to person, place, and time [Normal Station and Gait] : the gait and station were normal for the patient's age [No Focal Deficits] : no focal deficits [Well Groomed] : well groomed [General Appearance - In No Acute Distress] : no acute distress [Abdomen Soft] : soft [Abdomen Tenderness] : non-tender [Costovertebral Angle Tenderness] : no ~M costovertebral angle tenderness [Urethral Meatus] : meatus normal [Urinary Bladder Findings] : the bladder was normal on palpation [Scrotum] : the scrotum was normal [Testes Mass (___cm)] : there were no testicular masses [Respiration, Rhythm And Depth] : normal respiratory rhythm and effort [Affect] : the affect was normal [Mood] : the mood was normal [Not Anxious] : not anxious [No Palpable Adenopathy] : no palpable adenopathy

## 2023-06-28 NOTE — ASSESSMENT
[FreeTextEntry1] : discussed the gradual rise in PSA\par Up to 0.06  2 yrs ago had RALp with [place] 9 Cancer\par would benefit from radiation therapy\par will send referral to Dr. Delicia Sandoval\par followup 3 months after finished radiation treatments.\par all questions answered

## 2023-06-28 NOTE — HISTORY OF PRESENT ILLNESS
[FreeTextEntry1] : 67yo presents today for a follow-up appointment for prostate cancer. \par s/p RALP 6/2021\par \par PSA\par 0.06 (6.19.23)\par 0.04 (3.13.23.)\par  0.03 (10.14.22.)\par Pt. is doing well overall. \par Reports minimal stress incontinence. Wearing 1 pad/ day.\par Good erectile function. Tadalafil 5 mg daily. \par \par \par Patient is currently experiencing no symptoms. \par

## 2023-07-10 ENCOUNTER — NON-APPOINTMENT (OUTPATIENT)
Age: 68
End: 2023-07-10

## 2023-07-10 ENCOUNTER — APPOINTMENT (OUTPATIENT)
Dept: RADIATION ONCOLOGY | Facility: CLINIC | Age: 68
End: 2023-07-10
Payer: MEDICARE

## 2023-07-10 VITALS
HEIGHT: 73 IN | DIASTOLIC BLOOD PRESSURE: 84 MMHG | WEIGHT: 216 LBS | OXYGEN SATURATION: 95 % | SYSTOLIC BLOOD PRESSURE: 132 MMHG | BODY MASS INDEX: 28.63 KG/M2 | HEART RATE: 65 BPM | RESPIRATION RATE: 19 BRPM

## 2023-07-10 DIAGNOSIS — C61 MALIGNANT NEOPLASM OF PROSTATE: ICD-10-CM

## 2023-07-10 PROCEDURE — 99204 OFFICE O/P NEW MOD 45 MIN: CPT

## 2023-07-10 RX ORDER — ASPIRIN 81 MG
81 TABLET, DELAYED RELEASE (ENTERIC COATED) ORAL
Refills: 0 | Status: ACTIVE | COMMUNITY

## 2023-07-10 RX ORDER — ROSUVASTATIN CALCIUM 5 MG/1
5 TABLET, FILM COATED ORAL
Refills: 0 | Status: ACTIVE | COMMUNITY

## 2023-07-10 RX ORDER — LEVOTHYROXINE SODIUM 137 UG/1
TABLET ORAL
Refills: 0 | Status: ACTIVE | COMMUNITY

## 2023-07-10 NOTE — DISEASE MANAGEMENT
[b] : b [0-10] : 0 -10 ng/mL [7(4+3)] : Template Biopsy [place] Score: 7(4+3) [Extracapsular Extension] : Extracapsular extension [IIIC] : IIIC [Radical Prostatectomy] : Radical Prostatectomy [Patient had a radical prostatectomy] : Patient had a radical prostatectomy  [9] : [place] Score 9 [Positive] : Positive margins [3] : T3 [c] : c [0] : M0 [RadicalProstatectomyDate] : 6/21 [TotalNumberofnodesresected] : 15 [PositiveNodes] : 0

## 2023-07-10 NOTE — HISTORY OF PRESENT ILLNESS
[FreeTextEntry1] : The patient is a pleasant 68 year old male diagnosed with prostate cancer, s/p RALP and rising PSA.\par \par Patient had elevated PSA to 4.3 ng/ml with Dr. Galeas and was referred to urology.\par \par MRI prostate on 03/30/2021 showed 3.9 x 4.3 x 4.3 cm gland with 37 cc volume.\par Lesion throughout the right posterior peripheral zone involving the left posteromedial peripheral zone suspicious for synovial prostate cancer, PI RADS 5. Extraprostatic extension without evidence of seminal vesicle invasion or pelvic lymphadenopathy.\par \par Prostate biopsy 5/12/2021 demonstrated targeted lesion adenocarcinoma Dominic Score 3+4=7, perineural invasion identified, intraductal carcinoma present. Domin[place] Score 3+4=7 in 8/13 cores, 4+3=7 in 1/13 cores, 3+3=6 IN 1/13 cores. \par Path verification confirmed adenocarcinoma GS 4+3=7, prognostic group 3, positive perineural invasion.\par \par Patient underwent RALP on 6/25/2021 by Dr. Wallis. Pathology was upstaged to adenocarcinoma grade 4+5 =9 with Extra prostatic extension- nonfocal, Extensive perineural invasion, involving the right and left seminal vesicles, the apical margin and extends to the inked right posterior edge. Negative lymph nodes 0/15.\par \par PSA profile:\par 7/26/21 = <0.01\par 10/7/21 = <0.01\par 01/10/2022 <0.01\par 4/4/22= 0.01\par 7/06/22= 0.02\par 10/14/22= 0.03\par 3/13/23= 0.04\par 6/19/23= 0.06\par \par Denies GI/ symptoms.  He has never had a screening colonoscopy.  He denies nocturia and has normal erectile function but is not sexually active as he is single.  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  He has no family history of cancer.  He presents to discuss the role of radiation therapy in his care.

## 2023-07-10 NOTE — VITALS
[Maximal Pain Intensity: 0/10] : 0/10 [90: Able to carry normal activity; minor signs or symptoms of disease.] : 90: Able to carry normal activity; minor signs or symptoms of disease.  [Date: ____________] : Patient's last distress assessment performed on [unfilled]. [Patient Refusal] : Patient refused psychosocial distress assessment [0 - No Distress] : Distress Level: 0

## 2023-07-10 NOTE — REVIEW OF SYSTEMS
Vaccine Information Statement(s) for was given today. This has been reviewed, questions answered, and verbal consent given by Patient for injection(s) and administration of Tetanus/Diphtheria (Td) .        Patient tolerated without incident. See immunization grid for documentation.     [IPSS Score (0-40): ___] : IPSS score: [unfilled] [EPIC-CP Score (0-60): ___] : EPIC-CP score: [unfilled] [Hematuria: Grade 0] : Hematuria: Grade 0 [Urinary Incontinence: Grade 0] : Urinary Incontinence: Grade 0  [Urinary Retention: Grade 0] : Urinary Retention: Grade 0 [Urinary Tract Pain: Grade 0] : Urinary Tract Pain: Grade 0 [Urinary Urgency: Grade 0] : Urinary Urgency: Grade 0 [Urinary Frequency: Grade 0] : Urinary Frequency: Grade 0

## 2023-07-10 NOTE — PHYSICAL EXAM
[Normal Sphincter Tone] : normal sphincter tone [No Rectal Mass] : no rectal mass [External Hemorrhoid] : no external hemorrhoids [Axillary Lymph Nodes Enlarged Bilaterally] : axillary [Normal] : oriented to person, place and time, the affect was normal, the mood was normal and not anxious [FreeTextEntry1] : guaic negative

## 2023-08-14 PROCEDURE — 77333 RADIATION TREATMENT AID(S): CPT

## 2023-08-14 PROCEDURE — 77263 THER RADIOLOGY TX PLNG CPLX: CPT

## 2023-08-17 PROCEDURE — 77300 RADIATION THERAPY DOSE PLAN: CPT

## 2023-08-17 PROCEDURE — 77301 RADIOTHERAPY DOSE PLAN IMRT: CPT

## 2023-08-17 PROCEDURE — 77338 DESIGN MLC DEVICE FOR IMRT: CPT

## 2023-08-31 PROCEDURE — G6015: CPT

## 2023-08-31 PROCEDURE — G6002: CPT

## 2023-09-01 PROCEDURE — G6015: CPT

## 2023-09-01 PROCEDURE — G6002: CPT

## 2023-09-01 PROCEDURE — 77427 RADIATION TX MANAGEMENT X5: CPT

## 2023-09-05 ENCOUNTER — NON-APPOINTMENT (OUTPATIENT)
Age: 68
End: 2023-09-05

## 2023-09-05 PROCEDURE — G6015: CPT

## 2023-09-05 PROCEDURE — 77014: CPT

## 2023-09-05 NOTE — HISTORY OF PRESENT ILLNESS
[FreeTextEntry1] : The patient is a pleasant 68 year old male diagnosed with prostate cancer, s/p RALP and rising PSA.  Patient had elevated PSA to 4.3 ng/ml with Dr. Galeas and was referred to urology.  MRI prostate on 03/30/2021 showed 3.9 x 4.3 x 4.3 cm gland with 37 cc volume. Lesion throughout the right posterior peripheral zone involving the left posteromedial peripheral zone suspicious for synovial prostate cancer, PI RADS 5. Extraprostatic extension without evidence of seminal vesicle invasion or pelvic lymphadenopathy.  Prostate biopsy 5/12/2021 demonstrated targeted lesion adenocarcinoma Ogden Score 3+4=7, perineural invasion identified, intraductal carcinoma present. Dominic Score 3+4=7 in 8/13 cores, 4+3=7 in 1/13 cores, 3+3=6 IN 1/13 cores.  Path verification confirmed adenocarcinoma GS 4+3=7, prognostic group 3, positive perineural invasion.  Patient underwent RALP on 6/25/2021 by Dr. Wallis. Pathology was upstaged to adenocarcinoma grade 4+5 =9 with Extra prostatic extension- nonfocal, Extensive perineural invasion, involving the right and left seminal vesicles, the apical margin and extends to the inked right posterior edge. Negative lymph nodes 0/15.  PSA profile: 7/26/21 = <0.01 10/7/21 = <0.01 01/10/2022 <0.01 4/4/22= 0.01 7/06/22= 0.02 10/14/22= 0.03 3/13/23= 0.04 6/19/23= 0.06  Denies GI/ symptoms.  He had never had a screening colonoscopy and had one prior to RT as recommended which was normal.  He denies nocturia and has normal erectile function but is not sexually active as he is single.  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  He has no family history of cancer.    Pt seen for OTV 3/25. Feeling well.  Denies any new issues.

## 2023-09-05 NOTE — DISEASE MANAGEMENT
[Pathological] : TNM Stage: p [TTNM] : 3b [NTNM] : 0 [MTNM] : 0 [de-identified] : 793 [[place] [id number]] : 1772 [de-identified] : Prostate Bed  [RadicalProstatectomyDate] : 6/21 [TotalNumberofnodesresected] : 15 [PositiveNodes] : 0

## 2023-09-06 PROCEDURE — G6015: CPT

## 2023-09-06 PROCEDURE — G6002: CPT

## 2023-09-07 PROCEDURE — G6002: CPT

## 2023-09-07 PROCEDURE — 77336 RADIATION PHYSICS CONSULT: CPT

## 2023-09-07 PROCEDURE — G6015: CPT

## 2023-09-08 PROCEDURE — G6002: CPT

## 2023-09-08 PROCEDURE — G6015: CPT

## 2023-09-08 PROCEDURE — 77427 RADIATION TX MANAGEMENT X5: CPT

## 2023-09-11 PROCEDURE — G6002: CPT

## 2023-09-11 PROCEDURE — G6015: CPT

## 2023-09-12 ENCOUNTER — NON-APPOINTMENT (OUTPATIENT)
Age: 68
End: 2023-09-12

## 2023-09-12 VITALS — RESPIRATION RATE: 18 BRPM | BODY MASS INDEX: 28.63 KG/M2 | HEIGHT: 73 IN | WEIGHT: 216 LBS | HEART RATE: 68 BPM

## 2023-09-12 PROCEDURE — G6015: CPT

## 2023-09-12 PROCEDURE — 77014: CPT

## 2023-09-13 PROCEDURE — G6002: CPT

## 2023-09-13 PROCEDURE — G6015: CPT

## 2023-09-14 PROCEDURE — G6002: CPT

## 2023-09-14 PROCEDURE — G6015: CPT

## 2023-09-15 PROCEDURE — G6015: CPT

## 2023-09-15 PROCEDURE — G6002: CPT

## 2023-09-15 PROCEDURE — 77427 RADIATION TX MANAGEMENT X5: CPT

## 2023-09-18 PROCEDURE — G6002: CPT

## 2023-09-18 PROCEDURE — G6015: CPT

## 2023-09-19 ENCOUNTER — NON-APPOINTMENT (OUTPATIENT)
Age: 68
End: 2023-09-19

## 2023-09-19 VITALS
HEART RATE: 66 BPM | OXYGEN SATURATION: 95 % | HEIGHT: 73 IN | WEIGHT: 214.31 LBS | BODY MASS INDEX: 28.4 KG/M2 | RESPIRATION RATE: 18 BRPM

## 2023-09-19 PROCEDURE — G6015: CPT

## 2023-09-19 PROCEDURE — G6002: CPT

## 2023-09-20 PROCEDURE — G6002: CPT

## 2023-09-20 PROCEDURE — G6015: CPT

## 2023-09-21 PROCEDURE — 77336 RADIATION PHYSICS CONSULT: CPT

## 2023-09-21 PROCEDURE — G6015: CPT

## 2023-09-21 PROCEDURE — G6002: CPT

## 2023-09-22 PROCEDURE — G6002: CPT

## 2023-09-22 PROCEDURE — 77427 RADIATION TX MANAGEMENT X5: CPT

## 2023-09-22 PROCEDURE — G6015: CPT

## 2023-09-25 PROCEDURE — G6002: CPT

## 2023-09-25 PROCEDURE — G6015: CPT

## 2023-09-26 PROCEDURE — 77014: CPT

## 2023-09-26 PROCEDURE — G6015: CPT

## 2023-09-27 ENCOUNTER — NON-APPOINTMENT (OUTPATIENT)
Age: 68
End: 2023-09-27

## 2023-09-27 VITALS — HEART RATE: 64 BPM | HEIGHT: 73 IN | WEIGHT: 212 LBS | BODY MASS INDEX: 28.1 KG/M2 | RESPIRATION RATE: 18 BRPM

## 2023-09-27 PROCEDURE — G6015: CPT

## 2023-09-27 PROCEDURE — G6002: CPT

## 2023-09-28 PROCEDURE — G6015: CPT

## 2023-09-28 PROCEDURE — G6002: CPT

## 2023-09-28 PROCEDURE — 77336 RADIATION PHYSICS CONSULT: CPT

## 2023-10-02 PROCEDURE — 77427 RADIATION TX MANAGEMENT X5: CPT

## 2023-10-02 PROCEDURE — G6002: CPT

## 2023-10-02 PROCEDURE — G6015: CPT

## 2023-10-04 PROCEDURE — G6002: CPT

## 2023-10-04 PROCEDURE — G6015: CPT

## 2023-10-05 PROCEDURE — G6015: CPT

## 2023-10-05 PROCEDURE — G6002: CPT

## 2023-10-09 ENCOUNTER — NON-APPOINTMENT (OUTPATIENT)
Age: 68
End: 2023-10-09

## 2023-10-09 PROCEDURE — G6015: CPT

## 2023-10-09 PROCEDURE — G6002: CPT

## 2023-10-10 PROCEDURE — G6015: CPT

## 2023-10-10 PROCEDURE — G6002: CPT

## 2023-10-10 PROCEDURE — 77336 RADIATION PHYSICS CONSULT: CPT

## 2024-04-15 ENCOUNTER — RX RENEWAL (OUTPATIENT)
Age: 69
End: 2024-04-15

## 2024-04-15 LAB — PSA SERPL-MCNC: 0.06 NG/ML

## 2024-04-15 RX ORDER — TADALAFIL 5 MG/1
5 TABLET ORAL
Qty: 90 | Refills: 3 | Status: ACTIVE | COMMUNITY
Start: 2023-03-29 | End: 1900-01-01

## 2024-04-24 LAB
PSA SERPL-MCNC: <0.01 NG/ML
PSA SERPL-MCNC: <0.01 NG/ML

## 2024-06-10 NOTE — REASON FOR VISIT
ED Purposeful Rounding: Patient updated with plan of care. Patient pain, bathroom needs, positioning and comfort (warm blankets, dim lights, TV, Bed positioning, personal items) needs addressed. Patient given and instructed on use of call light.      [Follow-up Visit ___] : a follow-up visit  for [unfilled] [Other: _____] : [unfilled]

## 2024-07-16 LAB — PSA SERPL-MCNC: <0.01 NG/ML

## 2024-10-30 ENCOUNTER — APPOINTMENT (OUTPATIENT)
Dept: RADIATION ONCOLOGY | Facility: CLINIC | Age: 69
End: 2024-10-30
Payer: MEDICARE

## 2024-10-30 VITALS
SYSTOLIC BLOOD PRESSURE: 126 MMHG | HEIGHT: 73 IN | RESPIRATION RATE: 16 BRPM | OXYGEN SATURATION: 97 % | WEIGHT: 217 LBS | DIASTOLIC BLOOD PRESSURE: 80 MMHG | BODY MASS INDEX: 28.76 KG/M2 | HEART RATE: 67 BPM

## 2024-10-30 DIAGNOSIS — C61 MALIGNANT NEOPLASM OF PROSTATE: ICD-10-CM

## 2024-10-30 PROCEDURE — 99213 OFFICE O/P EST LOW 20 MIN: CPT

## 2024-10-30 RX ORDER — TELMISARTAN 20 MG/1
20 TABLET ORAL
Refills: 0 | Status: ACTIVE | COMMUNITY

## 2024-11-06 LAB — PSA SERPL-MCNC: <0.01 NG/ML

## 2024-12-24 PROBLEM — F10.90 ALCOHOL USE: Status: ACTIVE | Noted: 2021-04-14

## 2025-04-15 ENCOUNTER — NON-APPOINTMENT (OUTPATIENT)
Age: 70
End: 2025-04-15

## 2025-04-21 ENCOUNTER — APPOINTMENT (OUTPATIENT)
Dept: RADIATION ONCOLOGY | Facility: CLINIC | Age: 70
End: 2025-04-21
Payer: MEDICARE

## 2025-04-21 VITALS
DIASTOLIC BLOOD PRESSURE: 81 MMHG | BODY MASS INDEX: 28.89 KG/M2 | HEART RATE: 65 BPM | SYSTOLIC BLOOD PRESSURE: 132 MMHG | WEIGHT: 218 LBS | RESPIRATION RATE: 16 BRPM | OXYGEN SATURATION: 99 % | HEIGHT: 73 IN

## 2025-04-21 DIAGNOSIS — C61 MALIGNANT NEOPLASM OF PROSTATE: ICD-10-CM

## 2025-04-21 PROCEDURE — 99213 OFFICE O/P EST LOW 20 MIN: CPT
